# Patient Record
Sex: FEMALE | Race: BLACK OR AFRICAN AMERICAN | NOT HISPANIC OR LATINO | Employment: FULL TIME
[De-identification: names, ages, dates, MRNs, and addresses within clinical notes are randomized per-mention and may not be internally consistent; named-entity substitution may affect disease eponyms.]

---

## 2017-10-01 ENCOUNTER — HEALTH MAINTENANCE LETTER (OUTPATIENT)
Age: 32
End: 2017-10-01

## 2018-03-27 ENCOUNTER — OFFICE VISIT (OUTPATIENT)
Dept: OBGYN | Facility: CLINIC | Age: 33
End: 2018-03-27
Payer: COMMERCIAL

## 2018-03-27 VITALS
WEIGHT: 154.6 LBS | HEIGHT: 64 IN | DIASTOLIC BLOOD PRESSURE: 62 MMHG | BODY MASS INDEX: 26.4 KG/M2 | SYSTOLIC BLOOD PRESSURE: 100 MMHG

## 2018-03-27 DIAGNOSIS — D25.9 UTERINE LEIOMYOMA, UNSPECIFIED LOCATION: Primary | ICD-10-CM

## 2018-03-27 DIAGNOSIS — N97.9 INFERTILITY, FEMALE, PRIMARY: ICD-10-CM

## 2018-03-27 PROCEDURE — 99204 OFFICE O/P NEW MOD 45 MIN: CPT | Performed by: OBSTETRICS & GYNECOLOGY

## 2018-03-27 RX ORDER — NAPROXEN 500 MG/1
500 TABLET ORAL
COMMUNITY
Start: 2018-02-14 | End: 2018-10-16

## 2018-03-27 NOTE — PROGRESS NOTES
SUBJECTIVE:                                                   Natacha Espinoza is a 32 year old female who presents to clinic today for the following health issue(s):  Patient presents with:  Consult: Discuss fertility issues-       HPI:  The patient is seen today for fertility issues.  She has a long-standing history of infertility going back at least 10 years.  She has had a laparoscopic removal of her left tube because of hydrosalpinx.  Her right tube is patent.  She has been tried on cycles of Clomid ovulation induction.  She has had intrauterine inseminations.  She has a history of uterine fibroids but not any evaluation as to the size or change in the fibroids.  She would like to be pregnant.  She has a partner who has fathered children before.    Patient's last menstrual period was 2018 (exact date)..   Patient is sexually active, .  Using none for contraception.    reports that she has never smoked. She has never used smokeless tobacco.    STD testing offered?  Declined    Health maintenance updated:  yes    Today's PHQ-2 Score:   PHQ-2 (  [employer]) 2013   Q1: Little interest or pleasure in doing things 0   Q2: Feeling down, depressed or hopeless 0   PHQ-2 Score 0     Today's PHQ-9 Score: No flowsheet data found.  Today's CHRISTIN-7 Score: No flowsheet data found.    Problem list and histories reviewed & adjusted, as indicated.  Additional history: as documented.    Patient Active Problem List   Diagnosis     CARDIOVASCULAR SCREENING; LDL GOAL LESS THAN 160     Irregular bleeding     Tubal occlusion     Primary female infertility     Ovarian cyst     Pelvic pain     No past surgical history on file.   Social History   Substance Use Topics     Smoking status: Never Smoker     Smokeless tobacco: Never Used     Alcohol use No           Current Outpatient Prescriptions   Medication Sig     naproxen (NAPROSYN) 500 MG tablet Take 500 mg by mouth     No current facility-administered medications for  "this visit.      Allergies   Allergen Reactions     Chloroquine      Other reaction(s): Itching       ROS:  12 point review of systems negative other than symptoms noted below.    OBJECTIVE:     /62  Ht 5' 3.75\" (1.619 m)  Wt 154 lb 9.6 oz (70.1 kg)  LMP 03/21/2018 (Exact Date)  BMI 26.75 kg/m2  Body mass index is 26.75 kg/(m^2).    Exam:  Constitutional:  Appearance: Well nourished, well developed alert, in no acute distress  Chest:  Respiratory Effort:  Breathing unlabored  Cardiovascular: no raquel,a  Gastrointestinal:  Abdominal Examination:  Abdomen nontender to palpation,nt  Lymphatic: Lymph Nodes:  No other lymphadenopathy present  Skin:General Inspection:  No rashes present, no lesions present, no areas of discoloration; Genitalia and Groin:  No rashes present, no lesions present, no areas of discoloration, no masses present.  Neurologic/Psychiatric:  Mental Status:  Oriented X3   Pelvic Exam:  External Genitalia:     Normal appearance for age, no discharge present, no tenderness present, no inflammatory lesions present, color normal  Vagina:     Normal vaginal vault without central or paravaginal defects, no discharge present, no inflammatory lesions present, no masses present  Bladder:     Nontender to palpation  Urethra:   Urethral Body:  Urethra palpation normal, urethra structural support normal   Urethral Meatus:  No erythema or lesions present  Cervix:     Appearance healthy, no lesions present, nontender to palpation, no bleeding present  Uterus:     Uterus: firm, slightly enlarged sized and nontender, midplane in position.   Adnexa:     No adnexal tenderness present, no adnexal masses present  Perineum:     Perineum within normal limits, no evidence of trauma, no rashes or skin lesions present  Anus:     Anus within normal limits, no hemorrhoids present  Inguinal Lymph Nodes:     No lymphadenopathy present  Pubic Hair:     Normal pubic hair distribution for age  Genitalia and Groin:     No " rashes present, no lesions present, no areas of discoloration, no masses present       In-Clinic Test Results:  No results found for this or any previous visit (from the past 24 hour(s)).    ASSESSMENT/PLAN:                                                        ICD-10-CM    1. Uterine leiomyoma, unspecified location D25.9 MR Abdomen w Contrast   2. Infertility, female, primary N97.9 MR Abdomen w Contrast           Plan: Patient return to clinic following her MRI.  She will need to be evaluated as to whether or not a myomectomy may be indicated.  She will also be tried on letrozole protocol for ovulation induction.    Kole Jesus MD  [facility] [place]

## 2018-03-27 NOTE — MR AVS SNAPSHOT
"              After Visit Summary   3/27/2018    Natacha Espinoza    MRN: [id number]           Patient Information     Date Of Birth          1985        Visit Information        Provider Department      3/27/2018 11:30 AM Kole Jesus MD [facility]        Today's Diagnoses     Uterine leiomyoma, unspecified location    -  1    Infertility, female, primary           Follow-ups after your visit        Future tests that were ordered for you today     Open Future Orders        Priority Expected Expires Ordered    MR Abdomen w Contrast Routine  3/28/2019 3/27/2018            Who to contact     If you have questions or need follow up information about today's clinic visit or your schedule please contact [facility] directly at [phone].  Normal or non-critical lab and imaging results will be communicated to you by SocialShieldhart, letter or phone within 4 business days after the clinic has received the results. If you do not hear from us within 7 days, please contact the clinic through SocialShieldhart or phone. If you have a critical or abnormal lab result, we will notify you by phone as soon as possible.  Submit refill requests through [employer] or call your pharmacy and they will forward the refill request to us. Please allow 3 business days for your refill to be completed.          Additional Information About Your Visit        SocialShieldhart Information     [employer] lets you send messages to your doctor, view your test results, renew your prescriptions, schedule appointments and more. To sign up, go to [email or web address] . Click on \"Log in\" on the left side of the screen, which will take you to the Welcome page. Then click on \"Sign up Now\" on the right side of the page.     You will be asked to enter the access code listed below, as well as some personal information. Please follow the directions to create your username and password.     Your access code is: [email or web address]  Expires: " "2018  1:54 PM     Your access code will  in 90 days. If you need help or a new code, please call your [place] clinic or [phone].        Care EveryWhere ID     This is your Care EveryWhere ID. This could be used by other organizations to access your [place] medical records  [id number]        Your Vitals Were     Height Last Period BMI (Body Mass Index)             5' 3.75\" (1.619 m) 2018 (Exact Date) 26.75 kg/m2          Blood Pressure from Last 3 Encounters:   18 100/62   13 112/77   13 110/65    Weight from Last 3 Encounters:   18 154 lb 9.6 oz (70.1 kg)   13 144 lb (65.3 kg)   13 138 lb (62.6 kg)               Primary Care Provider Office Phone # Fax #    Jairon Mawuena Agbeh, [phone]       [address] [place] [address]        Equal Access to Services     YESSY HEREDIA : Hadii mehrdad ku hadbrie Sodarion, waaxda lukindra, qaybta kaaljeovanny kay, brian cox . So [facility] [phone].    ATENCIÓN: Si habla español, tiene a jimenez disposición servicios gratuitos de asistencia lingüística. [facility] [phone].    We comply with applicable federal civil rights laws and [place] laws. We do not discriminate on the basis of race, color, national origin, age, disability, sex, sexual orientation, or gender identity.            Thank you!     Thank you for choosing [facility]  for your care. Our goal is always to provide you with excellent care. Hearing back from our patients is one way we can continue to improve our services. Please take a few minutes to complete the written survey that you may receive in the mail after your visit with us. Thank you!             Your Updated Medication List - Protect others around you: Learn how to safely use, store and throw away your medicines at www.disposemymeds.org.          This list is accurate as of 3/27/18  1:54 PM.  Always use your most " recent med list.                   Brand Name Dispense Instructions for use Diagnosis    naproxen 500 MG tablet    NAPROSYN     Take 500 mg by mouth

## 2018-05-08 ENCOUNTER — RADIANT APPOINTMENT (OUTPATIENT)
Dept: MRI IMAGING | Facility: CLINIC | Age: 33
End: 2018-05-08
Attending: OBSTETRICS & GYNECOLOGY
Payer: COMMERCIAL

## 2018-05-08 PROCEDURE — A9585 GADOBUTROL INJECTION: HCPCS | Performed by: RADIOLOGY

## 2018-05-08 PROCEDURE — 72197 MRI PELVIS W/O & W/DYE: CPT | Performed by: RADIOLOGY

## 2018-05-08 RX ORDER — GADOBUTROL 604.72 MG/ML
7.5 INJECTION INTRAVENOUS ONCE
Status: COMPLETED | OUTPATIENT
Start: 2018-05-08 | End: 2018-05-08

## 2018-05-08 RX ADMIN — GADOBUTROL 7 ML: 604.72 INJECTION INTRAVENOUS at 12:14

## 2018-05-17 ENCOUNTER — TELEPHONE (OUTPATIENT)
Dept: OBGYN | Facility: CLINIC | Age: 33
End: 2018-05-17

## 2018-05-17 ENCOUNTER — OFFICE VISIT (OUTPATIENT)
Dept: OBGYN | Facility: CLINIC | Age: 33
End: 2018-05-17
Payer: COMMERCIAL

## 2018-05-17 VITALS
DIASTOLIC BLOOD PRESSURE: 62 MMHG | BODY MASS INDEX: 27.36 KG/M2 | WEIGHT: 154.4 LBS | SYSTOLIC BLOOD PRESSURE: 100 MMHG | HEIGHT: 63 IN

## 2018-05-17 DIAGNOSIS — D25.0 SUBMUCOUS LEIOMYOMA OF UTERUS: Primary | ICD-10-CM

## 2018-05-17 DIAGNOSIS — N88.2 CERVICAL STENOSIS (UTERINE CERVIX): ICD-10-CM

## 2018-05-17 PROCEDURE — 99214 OFFICE O/P EST MOD 30 MIN: CPT | Performed by: OBSTETRICS & GYNECOLOGY

## 2018-05-17 RX ORDER — MISOPROSTOL 200 UG/1
400 TABLET ORAL ONCE
Qty: 2 TABLET | Refills: 0 | Status: SHIPPED | OUTPATIENT
Start: 2018-05-17 | End: 2018-05-17

## 2018-05-17 NOTE — MR AVS SNAPSHOT
"              After Visit Summary   2018    Natacha Espinoza    MRN: [id number]           Patient Information     Date Of Birth          1985        Visit Information        Provider Department      2018 9:45 AM Kole Jesus MD [facility]        Today's Diagnoses     Submucous leiomyoma of uterus    -  1    Cervical stenosis (uterine cervix)           Follow-ups after your visit        Who to contact     If you have questions or need follow up information about today's clinic visit or your schedule please contact [facility] directly at [phone].  Normal or non-critical lab and imaging results will be communicated to you by Endocytehart, letter or phone within 4 business days after the clinic has received the results. If you do not hear from us within 7 days, please contact the clinic through [employer] or phone. If you have a critical or abnormal lab result, we will notify you by phone as soon as possible.  Submit refill requests through [employer] or call your pharmacy and they will forward the refill request to us. Please allow 3 business days for your refill to be completed.          Additional Information About Your Visit        MyChart Information     [employer] lets you send messages to your doctor, view your test results, renew your prescriptions, schedule appointments and more. To sign up, go to [email or web address] . Click on \"Log in\" on the left side of the screen, which will take you to the Welcome page. Then click on \"Sign up Now\" on the right side of the page.     You will be asked to enter the access code listed below, as well as some personal information. Please follow the directions to create your username and password.     Your access code is: [email or web address]  Expires: 2018  1:54 PM     Your access code will  in 90 days. If you need help or a new code, please call your [place] clinic or [phone].        Care EveryWhere ID     This is " "your Care EveryWhere ID. This could be used by other organizations to access your [place] medical records  [id number]        Your Vitals Were     Height Last Period BMI (Body Mass Index)             5' 3\" (1.6 m) 05/11/2018 (Exact Date) 27.35 kg/m2          Blood Pressure from Last 3 Encounters:   05/17/18 100/62   03/27/18 100/62   08/11/13 112/77    Weight from Last 3 Encounters:   05/17/18 154 lb 6.4 oz (70 kg)   03/27/18 154 lb 9.6 oz (70.1 kg)   04/18/13 144 lb (65.3 kg)              We Performed the Following     Stephanie-Operative Worksheet GYN          Today's Medication Changes          These changes are accurate as of 5/17/18 10:23 AM.  If you have any questions, ask your nurse or doctor.               Start taking these medicines.        Dose/Directions    misoprostol 200 MCG tablet   Commonly known as:  CYTOTEC   Used for:  Cervical stenosis (uterine cervix)   Started by:  Kole Jesus MD        Dose:  400 mcg   Take 2 tablets (400 mcg) by mouth once for 1 dose   Quantity:  2 tablet   Refills:  0            Where to get your medicines      These medications were sent to [facility] [id number] [place] [address] [address] [place] [address]  [address] [facility] [address]    Hours:  24-hours Phone:  [phone]     misoprostol 200 MCG tablet                Primary Care Provider Office Phone # Fax #    Jairon Mawuena Agbeh, [phone]       [address] [place] [address]        Equal Access to Services     YESSY HEREDIA AH: Hadii mehrdad Walsh, wanormada luqadaha, qaybta kaalmada karolynyamaritza, brian ng. So [facility] [phone].    ATENCIÓN: Si habla español, tiene a jimenez disposición servicios gratuitos de asistencia lingüística. Llame al [phone].    We comply with applicable federal civil rights laws and [place] laws. We do not discriminate on the basis of race, color, national origin, " age, disability, sex, sexual orientation, or gender identity.            Thank you!     Thank you for choosing [facility]  for your care. Our goal is always to provide you with excellent care. Hearing back from our patients is one way we can continue to improve our services. Please take a few minutes to complete the written survey that you may receive in the mail after your visit with us. Thank you!             Your Updated Medication List - Protect others around you: Learn how to safely use, store and throw away your medicines at www.disposemymeds.org.          This list is accurate as of 5/17/18 10:23 AM.  Always use your most recent med list.                   Brand Name Dispense Instructions for use Diagnosis    misoprostol 200 MCG tablet    CYTOTEC    2 tablet    Take 2 tablets (400 mcg) by mouth once for 1 dose    Cervical stenosis (uterine cervix)       naproxen 500 MG tablet    NAPROSYN     Take 500 mg by mouth

## 2018-05-17 NOTE — TELEPHONE ENCOUNTER
Pt will call to schedule    Order Questions      Question Answer Comment     Procedure name(s) - multi select hysteroscopy with MyoSure resection of intracavitary myoma      Reason for procedure submucous myoma      Is this a multi surgeon case? No      Laterality N/A      Request for additional equipment Other (see comments) None     Anesthesia General      Initiate Pre-op orders for above procedure: Yes, as ordered in Epic Additional orders noted there also     Location of Case: [facility] OR      Surgeon Procedure Time (incision to closure) in minutes (per procedure as applicable) 30      Note:  Surgical Case Time Needed (in minutes)     Patient Class (for admit prior to surgery, specify number of days in comments): Same day (hospital outpatient)      Why can t this outpatient surgery be done at the [facility] or  ASC? na      Vendor Needed? No

## 2018-05-17 NOTE — PROGRESS NOTES
SUBJECTIVE:                                                   Natacha Espinoza is a 32 year old female who presents to clinic today for the following health issue(s):  Patient presents with:  Follow Up For: patient had MRI done on 18. here for follow up results    HPI:  Patient is here for follow-up after her MRI.  Patient has been trying to get pregnant.  She has been unsuccessful.  Her partner has fathered children.  She has a history of uterine fibroid.  Recent MRI was performed which shows 2 myomas the larger myoma is in the posterior fundal portion of the uterus and then there is a smaller myoma which is noted to be submucosal.    Patient's last menstrual period was 2018 (exact date)..   Patient is sexually active, .  Using none for contraception.    reports that she has never smoked. She has never used smokeless tobacco.    STD testing offered?  Declined    Health maintenance updated:  yes    Today's PHQ-2 Score:   PHQ-2 (  [employer]) 2013   Q1: Little interest or pleasure in doing things 0   Q2: Feeling down, depressed or hopeless 0   PHQ-2 Score 0     Today's PHQ-9 Score: No flowsheet data found.  Today's CHRISTIN-7 Score: No flowsheet data found.    Problem list and histories reviewed & adjusted, as indicated.  Additional history: as documented.    Patient Active Problem List   Diagnosis     CARDIOVASCULAR SCREENING; LDL GOAL LESS THAN 160     Irregular bleeding     Tubal occlusion     Primary female infertility     Ovarian cyst     Pelvic pain     No past surgical history on file.   Social History   Substance Use Topics     Smoking status: Never Smoker     Smokeless tobacco: Never Used     Alcohol use No           Current Outpatient Prescriptions   Medication Sig     misoprostol (CYTOTEC) 200 MCG tablet Take 2 tablets (400 mcg) by mouth once for 1 dose     naproxen (NAPROSYN) 500 MG tablet Take 500 mg by mouth     No current facility-administered medications for this visit.      Allergies  "  Allergen Reactions     Chloroquine      Other reaction(s): Itching       ROS:  12 point review of systems negative other than symptoms noted below.    OBJECTIVE:     /62  Ht 5' 3\" (1.6 m)  Wt 154 lb 6.4 oz (70 kg)  LMP 05/11/2018 (Exact Date)  BMI 27.35 kg/m2  Body mass index is 27.35 kg/(m^2).    Exam:  Constitutional:  Appearance: Well nourished, well developed alert, in no acute distress  Neck:  Lymph Nodes:  No lymphadenopathy present; Thyroid:  Gland size normal, nontender, no nodules or masses present on palpation  Chest:  Respiratory Effort:  Breathing unlabored  Cardiovascular: Heart: Auscultation:  Regular rate, normal rhythm, no murmurs present  Gastrointestinal:  Abdominal Examination:  Abdomen nontender to palpation,  Lymphatic: Lymph Nodes:  No other lymphadenopathy present  Skin:General Inspection:  No rashes present, no lesions present, no areas of discoloration;   Neurologic/Psychiatric:  Mental Status:  Oriented X3      In-Clinic Test Results:    EXAMINATION: MR PELVIS W/O & W CONTRAST, 5/8/2018 12:28 PM     COMPARISON: Pelvic ultrasound on 9/11/2012     HISTORY: ; Uterine leiomyoma, unspecified location; Infertility,  female, primary     TECHNIQUE: Multiplanar, multisequence imaging was obtained of the  pelvis without and with intravenous contrast. Contrast dose: 7 ml  Gadavist     FINDINGS:   Vagina is distended with gel.     Uterus is retroverted and anteflexed, measuring approximately 7.4 x  6.2 x 9.0 cm. Junctional zone is not thickened. Normal hypointense  cervical stromal appearance is maintained. Nabothian cysts.  Endometrial stripe measures up to 4-5 mm on sagittal series 6     There are a few T2 hypointense hypoenhancing myometrial masses, the  largest of which is mural-based and measures 3.8 x 4.0 x 3.9 cm (axial  and CC dimensions, respectively) located at posterior uterine body  (series 5 image 18 and series 6 image 23). A second predominantly  mural, partially submucosal " mass measures 1.6 x 1.7 x 1.7 cm (axial  and CC dimensions, respectively) in the anterior uterine body, (series  5 image 13 and series 6 image 20). Associated mass effect results in  mild compression and distortion of the endometrial canal.     Degenerating right ovarian follicle or functional cyst. Area of  precontrast T1 hyperintensity in the left ovary, likely blood products  relating to a small hemorrhagic cyst. Otherwise normal appearance of  both ovaries. Left salpingectomy. Urinary bladder is decompressed.     Trace amount of free fluid in the pelvis, likely physiologic. No  significant lymphadenopathy in the pelvis. No worrisome osseous  lesion.         IMPRESSION:   1. Myomatous uterus including a dominant mural-based fibroid  posteriorly and a partially submucosal lesion anteriorly at the  body/fundus. Associated mass effect results in mild compression and  distortion of the endometrial canal.  2. Small probable left ovarian hemorrhagic cyst.     I have personally reviewed the examination and initial interpretation  and I agree with the findings.     JIM LIN MD    ASSESSMENT/PLAN:                                                        ICD-10-CM    1. Submucous leiomyoma of uterus D25.0 Stephanie-Operative Worksheet GYN   2. Cervical stenosis (uterine cervix) N88.2 misoprostol (CYTOTEC) 200 MCG tablet           Plan: Patient will be scheduled for a hysteroscopy with mild Essure resection of the submucous myoma.  She would like to schedule this and will be in the end of release from work form.  Her H&P was done today.  Procedure was explained to the patient along with potential risks and complications including risk of anesthesia, infection, bleeding and possible uterine perforation with injury to bowel.  Patient indicated that she understood and accepted these complications and potential risks.  She will call to schedule surgery.    Kole Jesus MD  [facility]

## 2018-05-18 NOTE — TELEPHONE ENCOUNTER
Spoke to pt and she stated she will call next week to schedule    Helga Kevin  Surgery Scheduler

## 2018-06-11 ENCOUNTER — HOSPITAL ENCOUNTER (OUTPATIENT)
Facility: CLINIC | Age: 33
End: 2018-06-11
Attending: OBSTETRICS & GYNECOLOGY | Admitting: OBSTETRICS & GYNECOLOGY
Payer: COMMERCIAL

## 2018-06-11 NOTE — TELEPHONE ENCOUNTER
Type of surgery: HSC w/MYOSURE RESECTION OF INTRACAVITARY MYOMA  Location of surgery: [facility] OR  Date and time of surgery: 7/13/2018 8:40AM ARRIVAL 6:40AM  Surgeon: SANJU Jesus  Pre-Op Appt Date: 6/19/2018  Post-Op Appt Date: TBD   Packet sent out: MAILED 6/11/2018  Pre-cert/Authorization completed:  TBD  Date: 6/11/2018 [place] w/Marlee Kevin  Surgery Scheduler

## 2018-07-09 RX ORDER — CEFAZOLIN SODIUM 1 G/3ML
1 INJECTION, POWDER, FOR SOLUTION INTRAMUSCULAR; INTRAVENOUS SEE ADMIN INSTRUCTIONS
Status: CANCELLED | OUTPATIENT
Start: 2018-07-09

## 2018-07-09 RX ORDER — CEFAZOLIN SODIUM 2 G/100ML
2 INJECTION, SOLUTION INTRAVENOUS
Status: CANCELLED | OUTPATIENT
Start: 2018-07-09

## 2018-07-11 NOTE — TELEPHONE ENCOUNTER
Called pt to confirm surgery due to repeated cancel of pre-op appointments.  Pt stated she called to cancel surgery and latest preop together and wants to do this in Sept  Will call me back with dates.    Spoke to Veronique at [facility] to cancel case.    Helga Kevin  Surgery Scheduler

## 2018-08-09 NOTE — TELEPHONE ENCOUNTER
Type of surgery: HSC w/MYOSURE RESECTION OF INTRACAVITARY MYOMA  Location of surgery: [facility] OR  Date and time of surgery: 9/21/2018 7:30am ARRIVAL 5:30am  Surgeon: Ann Marie  Pre-Op Appt Date: 9/17/2018   Post-Op Appt Date: TBD   Packet sent out: MAILED MAILED 8/9/2018  Pre-cert/Authorization completed:  TBD  Date: 8/9/2018 [place] w/Mariel Kevin  Surgery Scheduler

## 2018-08-28 ENCOUNTER — TELEPHONE (OUTPATIENT)
Dept: OBGYN | Facility: CLINIC | Age: 33
End: 2018-08-28

## 2018-08-28 NOTE — LETTER
[facility] [address]  [facility] [address]  [phone]        August 28, 2018    Natacha Espinoza  [address]   [place] [facility] [address]              Dear Natacha Espinoza    This is to remind you that your Hemoglobin is due.    You may call our office at [phone] to schedule an appointment.          Sincerely,        Kole Jesus MD

## 2018-09-17 ENCOUNTER — OFFICE VISIT (OUTPATIENT)
Dept: OBGYN | Facility: CLINIC | Age: 33
End: 2018-09-17
Payer: COMMERCIAL

## 2018-09-17 VITALS
HEIGHT: 63 IN | BODY MASS INDEX: 26.93 KG/M2 | DIASTOLIC BLOOD PRESSURE: 70 MMHG | WEIGHT: 152 LBS | SYSTOLIC BLOOD PRESSURE: 112 MMHG

## 2018-09-17 DIAGNOSIS — N92.0 MENORRHAGIA WITH REGULAR CYCLE: ICD-10-CM

## 2018-09-17 DIAGNOSIS — D25.0 SUBMUCOUS LEIOMYOMA OF UTERUS: ICD-10-CM

## 2018-09-17 DIAGNOSIS — Z01.812 PRE-OPERATIVE LABORATORY EXAMINATION: Primary | ICD-10-CM

## 2018-09-17 DIAGNOSIS — D25.0 SUBMUCOUS LEIOMYOMA OF UTERUS: Primary | ICD-10-CM

## 2018-09-17 LAB — HGB BLD-MCNC: 10 G/DL (ref 11.7–15.7)

## 2018-09-17 PROCEDURE — 85018 HEMOGLOBIN: CPT | Performed by: OBSTETRICS & GYNECOLOGY

## 2018-09-17 PROCEDURE — 36415 COLL VENOUS BLD VENIPUNCTURE: CPT | Performed by: OBSTETRICS & GYNECOLOGY

## 2018-09-17 PROCEDURE — 99213 OFFICE O/P EST LOW 20 MIN: CPT | Performed by: OBSTETRICS & GYNECOLOGY

## 2018-09-17 NOTE — LETTER
[facility] [place]  [address]  [facility] [address]  [phone]          September 17, 2018    RE:  Natacha Espinoza                                                                                                                                                       [address]   [facility] [address]            To whom it may concern:    Natacha Espinoza is under my professional care for Gynecology. She is having surgery on September 21, 2018  She may return to  Work September 27, 2018 with no restrictions.      Sincerely,        Kole Jesus MD

## 2018-09-17 NOTE — MR AVS SNAPSHOT
"              After Visit Summary   9/17/2018    Natacah Espinoza    MRN: [id number]           Patient Information     Date Of Birth          1985        Visit Information        Provider Department      9/17/2018 1:15 PM Kole Jesus MD [facility]        Today's Diagnoses     Submucous leiomyoma of uterus    -  1    Menorrhagia with regular cycle           Follow-ups after your visit        Your next 10 appointments already scheduled     Sep 21, 2018   Procedure with Kole Jesus MD   [facility] (--)    [address]  [facility] [id number] [phone]              Who to contact     If you have questions or need follow up information about today's clinic visit or your schedule please contact [facility] directly at [phone].  Normal or non-critical lab and imaging results will be communicated to you by MyChart, letter or phone within 4 business days after the clinic has received the results. If you do not hear from us within 7 days, please contact the clinic through MyChart or phone. If you have a critical or abnormal lab result, we will notify you by phone as soon as possible.  Submit refill requests through [employer] or call your pharmacy and they will forward the refill request to us. Please allow 3 business days for your refill to be completed.          Additional Information About Your Visit        MyChart Information     [employer] lets you send messages to your doctor, view your test results, renew your prescriptions, schedule appointments and more. To sign up, go to [email or web address] . Click on \"Log in\" on the left side of the screen, which will take you to the Welcome page. Then click on \"Sign up Now\" on the right side of the page.     You will be asked to enter the access code listed below, as well as some personal information. Please follow the directions to create your username and password.     Your " "access code is: [email or web address]  Expires: 2018  1:53 PM     Your access code will  in 90 days. If you need help or a new code, please call your [place] clinic or [phone].        Care EveryWhere ID     This is your Care EveryWhere ID. This could be used by other organizations to access your [place] medical records  [id number]        Your Vitals Were     Height Last Period BMI (Body Mass Index)             1.6 m (5' 3\") 2018 26.93 kg/m2          Blood Pressure from Last 3 Encounters:   18 112/70   18 100/62   18 100/62    Weight from Last 3 Encounters:   18 68.9 kg (152 lb)   18 70 kg (154 lb 6.4 oz)   18 70.1 kg (154 lb 9.6 oz)              Today, you had the following     No orders found for display       Primary Care Provider    None Specified       No primary provider on file.        Equal Access to Services     [facility]: Hadii aad ku hadasho Sodarion, waaxda luqadaha, qaybta kaalmada adeegyada, brian cox . So [facility] [phone].    ATENCIÓN: Si habla español, tiene a jimenez disposición servicios gratuitos de asistencia lingüística. Llame al [phone].    We comply with applicable federal civil rights laws and [place] laws. We do not discriminate on the basis of race, color, national origin, age, disability, sex, sexual orientation, or gender identity.            Thank you!     Thank you for choosing [facility] [place]  for your care. Our goal is always to provide you with excellent care. Hearing back from our patients is one way we can continue to improve our services. Please take a few minutes to complete the written survey that you may receive in the mail after your visit with us. Thank you!             Your Updated Medication List - Protect others around you: Learn how to safely use, store and throw away your medicines at www.disposemymeds.org.          This list is accurate as of 18  1:53 PM.  " Always use your most recent med list.                   Brand Name Dispense Instructions for use Diagnosis    naproxen 500 MG tablet    NAPROSYN     Take 500 mg by mouth

## 2018-09-17 NOTE — PROGRESS NOTES
"    SUBJECTIVE:                                                   Natacha Espinoza is a 33 year old female who presents to clinic today for the following health issue(s):  Patient presents with:  Pre-Op Exam: Myosure scheduled 18        HPI:  Patient has a known approximately 2 cm anterior submucous myoma.  She is scheduled for hysteroscopy with MyoSure resection on .  She seen today for preop.  She feels well.  She has had no flu, cold or symptoms of any medical condition.    Patient's last menstrual period was 2018..   Patient is sexually active, .  Using nothing for contraception.    reports that she has never smoked. She has never used smokeless tobacco.  Health maintenance updated:  yes      Problem list and histories reviewed & adjusted, as indicated.  Additional history: as documented.    Patient Active Problem List   Diagnosis     CARDIOVASCULAR SCREENING; LDL GOAL LESS THAN 160     Irregular bleeding     Tubal occlusion     Primary female infertility     Ovarian cyst     Pelvic pain     No past surgical history on file.   Social History   Substance Use Topics     Smoking status: Never Smoker     Smokeless tobacco: Never Used     Alcohol use No           Current Outpatient Prescriptions   Medication Sig     naproxen (NAPROSYN) 500 MG tablet Take 500 mg by mouth     No current facility-administered medications for this visit.      Allergies   Allergen Reactions     Chloroquine      Other reaction(s): Itching       ROS:  12 point review of systems negative other than symptoms noted below.    OBJECTIVE:     /70  Ht 1.6 m (5' 3\")  Wt 68.9 kg (152 lb)  LMP 2018  BMI 26.93 kg/m2  Body mass index is 26.93 kg/(m^2).    Exam:  Constitutional:  Appearance: Well nourished, well developed alert, in no acute distress  Neck:  Lymph Nodes:  No lymphadenopathy present; Thyroid:  Gland size normal, nontender, no nodules or masses present on palpation  Chest:  Respiratory Effort:  " Breathing unlabored  Cardiovascular: Heart: Auscultation:  Regular rate, normal rhythm, no murmurs present  Skin:General Inspection:  No rashes present, no lesions present, no areas of discoloration;   Neurologic/Psychiatric:  Mental Status:  Oriented X3      In-Clinic Test Results:  Results for orders placed or performed in visit on 09/17/18 (from the past 24 hour(s))   Hemoglobin   Result Value Ref Range    Hemoglobin 10.0 (L) 11.7 - 15.7 g/dL       ASSESSMENT/PLAN:                                                        ICD-10-CM    1. Submucous leiomyoma of uterus D25.0    2. Menorrhagia with regular cycle N92.0            Plan: Patient is scheduled for hysteroscopy with MyoSure resection of submucous myoma on September 21.  I reviewed the procedure with the patient.  This will be done under general anesthesia.  The patient is very aware of any potential risks such as uterine perforation, infection, bleeding and injury to other organs such as bowel or bladder.  She is planning on taking a few days off of work following the procedure.    Kole Jesus MD  [facility] [place]

## 2018-09-19 NOTE — H&P (VIEW-ONLY)
"    SUBJECTIVE:                                                   Natacha Espinoza is a 33 year old female who presents to clinic today for the following health issue(s):  Patient presents with:  Pre-Op Exam: Myosure scheduled 18        HPI:  Patient has a known approximately 2 cm anterior submucous myoma.  She is scheduled for hysteroscopy with mild Essure resection on .  She seen today for preop.  She feels well.  She has had no flu, cold or symptoms of any medical condition.    Patient's last menstrual period was 2018..   Patient is sexually active, .  Using nothing for contraception.    reports that she has never smoked. She has never used smokeless tobacco.  Health maintenance updated:  yes      Problem list and histories reviewed & adjusted, as indicated.  Additional history: as documented.    Patient Active Problem List   Diagnosis     CARDIOVASCULAR SCREENING; LDL GOAL LESS THAN 160     Irregular bleeding     Tubal occlusion     Primary female infertility     Ovarian cyst     Pelvic pain     No past surgical history on file.   Social History   Substance Use Topics     Smoking status: Never Smoker     Smokeless tobacco: Never Used     Alcohol use No           Current Outpatient Prescriptions   Medication Sig     naproxen (NAPROSYN) 500 MG tablet Take 500 mg by mouth     No current facility-administered medications for this visit.      Allergies   Allergen Reactions     Chloroquine      Other reaction(s): Itching       ROS:  12 point review of systems negative other than symptoms noted below.    OBJECTIVE:     /70  Ht 1.6 m (5' 3\")  Wt 68.9 kg (152 lb)  LMP 2018  BMI 26.93 kg/m2  Body mass index is 26.93 kg/(m^2).    Exam:  Constitutional:  Appearance: Well nourished, well developed alert, in no acute distress  Neck:  Lymph Nodes:  No lymphadenopathy present; Thyroid:  Gland size normal, nontender, no nodules or masses present on palpation  Chest:  Respiratory Effort:  " Breathing unlabored  Cardiovascular: Heart: Auscultation:  Regular rate, normal rhythm, no murmurs present  Skin:General Inspection:  No rashes present, no lesions present, no areas of discoloration;   Neurologic/Psychiatric:  Mental Status:  Oriented X3      In-Clinic Test Results:  Results for orders placed or performed in visit on 09/17/18 (from the past 24 hour(s))   Hemoglobin   Result Value Ref Range    Hemoglobin 10.0 (L) 11.7 - 15.7 g/dL       ASSESSMENT/PLAN:                                                        ICD-10-CM    1. Submucous leiomyoma of uterus D25.0    2. Menorrhagia with regular cycle N92.0            Plan: Patient is scheduled for hysteroscopy with mild Essure resection of submucous myoma on September 21.  I reviewed the procedure with the patient.  This will be done under general anesthesia.  The patient is very aware of any potential risks such as uterine perforation, infection, bleeding and injury to other organs such as bowel or bladder.  She is planning on taking a few days off of work following the procedure.    Kole Jesus MD  [facility] [place]

## 2018-09-20 DIAGNOSIS — N88.2 STENOSIS OF CERVIX: Primary | ICD-10-CM

## 2018-09-20 RX ORDER — MISOPROSTOL 200 UG/1
400 TABLET ORAL ONCE
Qty: 2 TABLET | Refills: 0 | Status: ON HOLD | OUTPATIENT
Start: 2018-09-20 | End: 2018-09-21

## 2018-09-21 ENCOUNTER — ANESTHESIA (OUTPATIENT)
Dept: SURGERY | Facility: CLINIC | Age: 33
End: 2018-09-21
Payer: COMMERCIAL

## 2018-09-21 ENCOUNTER — ANESTHESIA EVENT (OUTPATIENT)
Dept: SURGERY | Facility: CLINIC | Age: 33
End: 2018-09-21
Payer: COMMERCIAL

## 2018-09-21 ENCOUNTER — HOSPITAL ENCOUNTER (OUTPATIENT)
Facility: CLINIC | Age: 33
Discharge: HOME | End: 2018-09-21
Attending: OBSTETRICS & GYNECOLOGY | Admitting: OBSTETRICS & GYNECOLOGY
Payer: COMMERCIAL

## 2018-09-21 ENCOUNTER — SURGERY (OUTPATIENT)
Age: 33
End: 2018-09-21
Payer: COMMERCIAL

## 2018-09-21 VITALS
BODY MASS INDEX: 26.93 KG/M2 | HEIGHT: 63 IN | SYSTOLIC BLOOD PRESSURE: 113 MMHG | TEMPERATURE: 97.3 F | OXYGEN SATURATION: 100 % | HEART RATE: 74 BPM | DIASTOLIC BLOOD PRESSURE: 67 MMHG | RESPIRATION RATE: 16 BRPM | WEIGHT: 152 LBS

## 2018-09-21 DIAGNOSIS — G89.18 POST-OP PAIN: Primary | ICD-10-CM

## 2018-09-21 LAB
ABO + RH BLD: NORMAL
ABO + RH BLD: NORMAL
B-HCG SERPL-ACNC: <1 IU/L (ref 0–5)
BLD GP AB SCN SERPL QL: NORMAL
BLOOD BANK CMNT PATIENT-IMP: NORMAL
HGB BLD-MCNC: 9.9 G/DL (ref 11.7–15.7)
SPECIMEN EXP DATE BLD: NORMAL

## 2018-09-21 PROCEDURE — 71000012 ZZH RECOVERY PHASE 1 LEVEL 1 FIRST HR: Performed by: OBSTETRICS & GYNECOLOGY

## 2018-09-21 PROCEDURE — 25000128 H RX IP 250 OP 636: Performed by: ANESTHESIOLOGY

## 2018-09-21 PROCEDURE — 25000128 H RX IP 250 OP 636: Performed by: OBSTETRICS & GYNECOLOGY

## 2018-09-21 PROCEDURE — 85018 HEMOGLOBIN: CPT | Performed by: OBSTETRICS & GYNECOLOGY

## 2018-09-21 PROCEDURE — 71000013 ZZH RECOVERY PHASE 1 LEVEL 1 EA ADDTL HR: Performed by: OBSTETRICS & GYNECOLOGY

## 2018-09-21 PROCEDURE — 25000128 H RX IP 250 OP 636: Performed by: NURSE ANESTHETIST, CERTIFIED REGISTERED

## 2018-09-21 PROCEDURE — 37000008 ZZH ANESTHESIA TECHNICAL FEE, 1ST 30 MIN: Performed by: OBSTETRICS & GYNECOLOGY

## 2018-09-21 PROCEDURE — 36415 COLL VENOUS BLD VENIPUNCTURE: CPT | Performed by: OBSTETRICS & GYNECOLOGY

## 2018-09-21 PROCEDURE — 84702 CHORIONIC GONADOTROPIN TEST: CPT | Performed by: OBSTETRICS & GYNECOLOGY

## 2018-09-21 PROCEDURE — 58561 HYSTEROSCOPY REMOVE MYOMA: CPT | Performed by: OBSTETRICS & GYNECOLOGY

## 2018-09-21 PROCEDURE — 37000009 ZZH ANESTHESIA TECHNICAL FEE, EACH ADDTL 15 MIN: Performed by: OBSTETRICS & GYNECOLOGY

## 2018-09-21 PROCEDURE — 25000125 ZZHC RX 250: Performed by: NURSE ANESTHETIST, CERTIFIED REGISTERED

## 2018-09-21 PROCEDURE — 27210794 ZZH OR GENERAL SUPPLY STERILE: Performed by: OBSTETRICS & GYNECOLOGY

## 2018-09-21 PROCEDURE — 71000027 ZZH RECOVERY PHASE 2 EACH 15 MINS: Performed by: OBSTETRICS & GYNECOLOGY

## 2018-09-21 PROCEDURE — 88305 TISSUE EXAM BY PATHOLOGIST: CPT | Mod: 26 | Performed by: OBSTETRICS & GYNECOLOGY

## 2018-09-21 PROCEDURE — 40000170 ZZH STATISTIC PRE-PROCEDURE ASSESSMENT II: Performed by: OBSTETRICS & GYNECOLOGY

## 2018-09-21 PROCEDURE — 25000125 ZZHC RX 250: Performed by: OBSTETRICS & GYNECOLOGY

## 2018-09-21 PROCEDURE — 36000058 ZZH SURGERY LEVEL 3 EA 15 ADDTL MIN: Performed by: OBSTETRICS & GYNECOLOGY

## 2018-09-21 PROCEDURE — 86900 BLOOD TYPING SEROLOGIC ABO: CPT | Performed by: OBSTETRICS & GYNECOLOGY

## 2018-09-21 PROCEDURE — 36000056 ZZH SURGERY LEVEL 3 1ST 30 MIN: Performed by: OBSTETRICS & GYNECOLOGY

## 2018-09-21 PROCEDURE — 86901 BLOOD TYPING SEROLOGIC RH(D): CPT | Performed by: OBSTETRICS & GYNECOLOGY

## 2018-09-21 PROCEDURE — 25800025 ZZH RX 258: Performed by: OBSTETRICS & GYNECOLOGY

## 2018-09-21 PROCEDURE — 88305 TISSUE EXAM BY PATHOLOGIST: CPT | Performed by: OBSTETRICS & GYNECOLOGY

## 2018-09-21 PROCEDURE — 86850 RBC ANTIBODY SCREEN: CPT | Performed by: OBSTETRICS & GYNECOLOGY

## 2018-09-21 PROCEDURE — 25000132 ZZH RX MED GY IP 250 OP 250 PS 637: Performed by: OBSTETRICS & GYNECOLOGY

## 2018-09-21 RX ORDER — IBUPROFEN 600 MG/1
600 TABLET, FILM COATED ORAL
Status: DISCONTINUED | OUTPATIENT
Start: 2018-09-21 | End: 2018-09-21 | Stop reason: HOSPADM

## 2018-09-21 RX ORDER — PROPOFOL 10 MG/ML
INJECTION, EMULSION INTRAVENOUS CONTINUOUS PRN
Status: DISCONTINUED | OUTPATIENT
Start: 2018-09-21 | End: 2018-09-21

## 2018-09-21 RX ORDER — ONDANSETRON 2 MG/ML
4 INJECTION INTRAMUSCULAR; INTRAVENOUS EVERY 30 MIN PRN
Status: DISCONTINUED | OUTPATIENT
Start: 2018-09-21 | End: 2018-09-21 | Stop reason: HOSPADM

## 2018-09-21 RX ORDER — DEXAMETHASONE SODIUM PHOSPHATE 4 MG/ML
INJECTION, SOLUTION INTRA-ARTICULAR; INTRALESIONAL; INTRAMUSCULAR; INTRAVENOUS; SOFT TISSUE PRN
Status: DISCONTINUED | OUTPATIENT
Start: 2018-09-21 | End: 2018-09-21

## 2018-09-21 RX ORDER — HYDROCODONE BITARTRATE AND ACETAMINOPHEN 5; 325 MG/1; MG/1
2 TABLET ORAL
Status: COMPLETED | OUTPATIENT
Start: 2018-09-21 | End: 2018-09-21

## 2018-09-21 RX ORDER — ONDANSETRON 4 MG/1
4 TABLET, ORALLY DISINTEGRATING ORAL EVERY 30 MIN PRN
Status: DISCONTINUED | OUTPATIENT
Start: 2018-09-21 | End: 2018-09-21 | Stop reason: HOSPADM

## 2018-09-21 RX ORDER — PROPOFOL 10 MG/ML
INJECTION, EMULSION INTRAVENOUS PRN
Status: DISCONTINUED | OUTPATIENT
Start: 2018-09-21 | End: 2018-09-21

## 2018-09-21 RX ORDER — CEFAZOLIN SODIUM 1 G/3ML
1 INJECTION, POWDER, FOR SOLUTION INTRAMUSCULAR; INTRAVENOUS SEE ADMIN INSTRUCTIONS
Status: DISCONTINUED | OUTPATIENT
Start: 2018-09-21 | End: 2018-09-21 | Stop reason: HOSPADM

## 2018-09-21 RX ORDER — NALOXONE HYDROCHLORIDE 0.4 MG/ML
.1-.4 INJECTION, SOLUTION INTRAMUSCULAR; INTRAVENOUS; SUBCUTANEOUS
Status: DISCONTINUED | OUTPATIENT
Start: 2018-09-21 | End: 2018-09-21 | Stop reason: HOSPADM

## 2018-09-21 RX ORDER — LIDOCAINE HYDROCHLORIDE 20 MG/ML
INJECTION, SOLUTION INFILTRATION; PERINEURAL PRN
Status: DISCONTINUED | OUTPATIENT
Start: 2018-09-21 | End: 2018-09-21

## 2018-09-21 RX ORDER — SODIUM CHLORIDE, SODIUM LACTATE, POTASSIUM CHLORIDE, CALCIUM CHLORIDE 600; 310; 30; 20 MG/100ML; MG/100ML; MG/100ML; MG/100ML
INJECTION, SOLUTION INTRAVENOUS CONTINUOUS
Status: DISCONTINUED | OUTPATIENT
Start: 2018-09-21 | End: 2018-09-21 | Stop reason: HOSPADM

## 2018-09-21 RX ORDER — HYDROCODONE BITARTRATE AND ACETAMINOPHEN 5; 325 MG/1; MG/1
1-2 TABLET ORAL EVERY 4 HOURS PRN
Qty: 20 TABLET | Refills: 0 | Status: SHIPPED | OUTPATIENT
Start: 2018-09-21 | End: 2018-10-16

## 2018-09-21 RX ORDER — CEFAZOLIN SODIUM 2 G/100ML
2 INJECTION, SOLUTION INTRAVENOUS
Status: COMPLETED | OUTPATIENT
Start: 2018-09-21 | End: 2018-09-21

## 2018-09-21 RX ORDER — FENTANYL CITRATE 50 UG/ML
INJECTION, SOLUTION INTRAMUSCULAR; INTRAVENOUS PRN
Status: DISCONTINUED | OUTPATIENT
Start: 2018-09-21 | End: 2018-09-21

## 2018-09-21 RX ORDER — KETOROLAC TROMETHAMINE 30 MG/ML
30 INJECTION, SOLUTION INTRAMUSCULAR; INTRAVENOUS ONCE
Status: COMPLETED | OUTPATIENT
Start: 2018-09-21 | End: 2018-09-21

## 2018-09-21 RX ORDER — FENTANYL CITRATE 50 UG/ML
25-50 INJECTION, SOLUTION INTRAMUSCULAR; INTRAVENOUS
Status: DISCONTINUED | OUTPATIENT
Start: 2018-09-21 | End: 2018-09-21 | Stop reason: HOSPADM

## 2018-09-21 RX ORDER — MAGNESIUM HYDROXIDE 1200 MG/15ML
LIQUID ORAL PRN
Status: DISCONTINUED | OUTPATIENT
Start: 2018-09-21 | End: 2018-09-21 | Stop reason: HOSPADM

## 2018-09-21 RX ORDER — ONDANSETRON 2 MG/ML
INJECTION INTRAMUSCULAR; INTRAVENOUS PRN
Status: DISCONTINUED | OUTPATIENT
Start: 2018-09-21 | End: 2018-09-21

## 2018-09-21 RX ORDER — MEPERIDINE HYDROCHLORIDE 25 MG/ML
12.5 INJECTION INTRAMUSCULAR; INTRAVENOUS; SUBCUTANEOUS
Status: DISCONTINUED | OUTPATIENT
Start: 2018-09-21 | End: 2018-09-21 | Stop reason: HOSPADM

## 2018-09-21 RX ORDER — SODIUM CHLORIDE, SODIUM LACTATE, POTASSIUM CHLORIDE, CALCIUM CHLORIDE 600; 310; 30; 20 MG/100ML; MG/100ML; MG/100ML; MG/100ML
INJECTION, SOLUTION INTRAVENOUS CONTINUOUS PRN
Status: DISCONTINUED | OUTPATIENT
Start: 2018-09-21 | End: 2018-09-21

## 2018-09-21 RX ORDER — IBUPROFEN 600 MG/1
600 TABLET, FILM COATED ORAL EVERY 6 HOURS PRN
Qty: 30 TABLET | Refills: 0 | Status: SHIPPED | OUTPATIENT
Start: 2018-09-21 | End: 2018-10-31

## 2018-09-21 RX ORDER — HYDROMORPHONE HYDROCHLORIDE 1 MG/ML
.3-.5 INJECTION, SOLUTION INTRAMUSCULAR; INTRAVENOUS; SUBCUTANEOUS EVERY 10 MIN PRN
Status: DISCONTINUED | OUTPATIENT
Start: 2018-09-21 | End: 2018-09-21 | Stop reason: HOSPADM

## 2018-09-21 RX ORDER — GLYCOPYRROLATE 0.2 MG/ML
INJECTION, SOLUTION INTRAMUSCULAR; INTRAVENOUS PRN
Status: DISCONTINUED | OUTPATIENT
Start: 2018-09-21 | End: 2018-09-21

## 2018-09-21 RX ADMIN — SODIUM CHLORIDE, POTASSIUM CHLORIDE, SODIUM LACTATE AND CALCIUM CHLORIDE: 600; 310; 30; 20 INJECTION, SOLUTION INTRAVENOUS at 10:23

## 2018-09-21 RX ADMIN — CEFAZOLIN SODIUM 2 G: 2 INJECTION, SOLUTION INTRAVENOUS at 07:50

## 2018-09-21 RX ADMIN — SODIUM CHLORIDE 3000 ML: 900 IRRIGANT IRRIGATION at 08:04

## 2018-09-21 RX ADMIN — LIDOCAINE HYDROCHLORIDE 80 MG: 20 INJECTION, SOLUTION INFILTRATION; PERINEURAL at 07:45

## 2018-09-21 RX ADMIN — VASOPRESSIN 10 ML: 20 INJECTION INTRAVENOUS at 08:04

## 2018-09-21 RX ADMIN — ONDANSETRON 4 MG: 2 INJECTION INTRAMUSCULAR; INTRAVENOUS at 08:19

## 2018-09-21 RX ADMIN — HYDROCODONE BITARTRATE AND ACETAMINOPHEN 2 TABLET: 5; 325 TABLET ORAL at 11:10

## 2018-09-21 RX ADMIN — DEXAMETHASONE SODIUM PHOSPHATE 4 MG: 4 INJECTION, SOLUTION INTRA-ARTICULAR; INTRALESIONAL; INTRAMUSCULAR; INTRAVENOUS; SOFT TISSUE at 07:55

## 2018-09-21 RX ADMIN — SODIUM CHLORIDE 3000 ML: 900 IRRIGANT IRRIGATION at 08:06

## 2018-09-21 RX ADMIN — PROPOFOL 200 MCG/KG/MIN: 10 INJECTION, EMULSION INTRAVENOUS at 07:48

## 2018-09-21 RX ADMIN — SODIUM CHLORIDE, POTASSIUM CHLORIDE, SODIUM LACTATE AND CALCIUM CHLORIDE: 600; 310; 30; 20 INJECTION, SOLUTION INTRAVENOUS at 07:39

## 2018-09-21 RX ADMIN — MIDAZOLAM 2 MG: 1 INJECTION INTRAMUSCULAR; INTRAVENOUS at 07:40

## 2018-09-21 RX ADMIN — FENTANYL CITRATE 50 MCG: 50 INJECTION, SOLUTION INTRAMUSCULAR; INTRAVENOUS at 07:40

## 2018-09-21 RX ADMIN — GLYCOPYRROLATE 0.2 MG: 0.2 INJECTION, SOLUTION INTRAMUSCULAR; INTRAVENOUS at 08:08

## 2018-09-21 RX ADMIN — FENTANYL CITRATE 25 MCG: 50 INJECTION, SOLUTION INTRAMUSCULAR; INTRAVENOUS at 09:41

## 2018-09-21 RX ADMIN — PROPOFOL 160 MG: 10 INJECTION, EMULSION INTRAVENOUS at 07:45

## 2018-09-21 RX ADMIN — SODIUM CHLORIDE 1000 ML: 900 IRRIGANT IRRIGATION at 08:06

## 2018-09-21 RX ADMIN — KETOROLAC TROMETHAMINE 30 MG: 30 INJECTION, SOLUTION INTRAMUSCULAR at 09:32

## 2018-09-21 ASSESSMENT — LIFESTYLE VARIABLES: TOBACCO_USE: 0

## 2018-09-21 NOTE — DISCHARGE INSTRUCTIONS
Same Day Surgery Discharge Instructions for  Sedation and General Anesthesia       It's not unusual to feel dizzy, light-headed or faint for up to 24 hours after surgery or while taking pain medication.  If you have these symptoms: sit for a few minutes before standing and have someone assist you when you get up to walk or use the bathroom.      You should rest and relax for the next 24 hours. We recommend you make arrangements to have an adult stay with you for at least 24 hours after your discharge.  Avoid hazardous and strenuous activity.      DO NOT DRIVE any vehicle or operate mechanical equipment for 24 hours following the end of your surgery.  Even though you may feel normal, your reactions may be affected by the medication you have received.      Do not drink alcoholic beverages for 24 hours following surgery.       Slowly progress to your regular diet as you feel able. It's not unusual to feel nauseated and/or vomit after receiving anesthesia.  If you develop these symptoms, drink clear liquids (apple juice, ginger ale, broth, 7-up, etc. ) until you feel better.  If your nausea and vomiting persists for 24 hours, please notify your surgeon.        All narcotic pain medications, along with inactivity and anesthesia, can cause constipation. Drinking plenty of liquids and increasing fiber intake will help.      For any questions of a medical nature, call your surgeon.      Do not make important decisions for 24 hours.      If you had general anesthesia, you may have a sore throat for a couple of days related to the breathing tube used during surgery.  You may use Cepacol lozenges to help with this discomfort.  If it worsens or if you develop a fever, contact your surgeon.       If you feel your pain is not well managed with the pain medications prescribed by your surgeon, please contact your surgeon's office to let them know so they can address your concerns.       [facility]  Discharge  Instructions  Following D & C / Hysteroscopy    Activity  You may resume normal activities including lifting as needed.  It is permissible to climb stairs. You may drive a car after 24 hours as long as you are not taking narcotic pain pills.   Baths or showers are perfectly acceptable.     Vaginal Discharge  You may have some vaginal bleeding or discharge for about a week after procedure.  You may use tampons or pads.    Temperature  If you develop temperature elevations to over 101  Fahrenheit, your physician should be called immediately.    Diet  [place] or light diet is advisable the day of surgery.  If nausea persists, continue this diet.  If severe, call.    Follow-up  Make an appointment in 1-2 weeks if instructed to at: [phone]         [facility]   [phone]                                                            Today you received Toradol, an antiinflammatory medication similar to Ibuprofen.  You should not take other antiinflammatory medication, such as Ibuprofen, Motrin, Advil, Aleve, Naprosyn, etc until 3:30 pm.

## 2018-09-21 NOTE — SUMMARY OF CARE
"Pt discharge to home via boyfriend. discharge instructions reviewed, boyfriend verbalized understanding. Rx medication was reveiwed. Pt did take PO medication prior to discharge (see MAR) stated her pain was \"better\". discharge instructions/medications updated and sent with pt/boyfriend. discharge to home via volunteer services.  "

## 2018-09-21 NOTE — OP NOTE
Procedure Date: 2018      PREOPERATIVE STATUS:  The patient is a 33-year-old female,  0, who has known anterior uterine myomas.  She had her most recent ultrasound in May which showed an approximately 2 cm anterior submucous myoma and an approximately 4 cm posterior fundal myoma.  It was not appreciated on MRI that it was submucosal.  She is now admitted for resection of the submucosal myomas.      PREOPERATIVE DIAGNOSIS: Submucosal myoma.      POSTOPERATIVE DIAGNOSIS: Two submucosal myomas anteriorly and posteriorly, possible residual posterior submucous myoma.      OPERATION:  Hysteroscopy with MyoSure resection of submucous myomas.      SURGEON:  Kole Jesus MD      ANESTHESIA:  General.      OPERATIVE PROCEDURE:  Under general anesthesia, the patient was placed in lithotomy position, prepped and draped in the usual fashion.  Speculum was placed in the vagina, anterior lip of cervix grasped with a tenaculum and an os finder was inserted.  The cervical os was dilated from having taken Cytotec the night before.  The cervix was dilated through a 21 Syriac.  A spinal needle was then placed into the cavity where 10 mL of a dilute vasopressin solution was injected into the myometrium.  The MyoSure hysteroscope was then inserted and the findings of the anterior 2 cm type 1 submucous myoma was noted and there was much larger posterior submucous myoma which measured approximately 5 cm and was type 2.  Using the resection tool, the posterior myoma was resected first. In total approximately 75% of the myoma was removed.  Anteriorly the entire 2 cm myoma was resected.  Fluid deficit was approaching 1500 mL, although there was a lot of fluid on the floor.  It was elected to stop at this time as the posterior myoma was no longer delivering into the cavity.  It was decided that if necessary will come back and do a second procedure.  At the end of the case, another 10 mL of vasopressin was injected into the  uterus.  The instruments were removed.  The patient was taken to recovery in good condition.  Estimated blood loss was 5 mL.          RILEY LUCAS MD             D: 2018   T: 2018   MT: HAILY      Name:     RUDY CRUZ   MRN:      [id number]        Account:        [id number]   :      1985           Procedure Date: 2018      Document: [id number]       cc: Emiliano Martinez MD

## 2018-09-21 NOTE — IP AVS SNAPSHOT
[facility] PreOP/Phase II    [address]    [place] [address]    Phone:  [phone]                                       After Visit Summary   9/21/2018    Natacha Espinoza    MRN: [id number]           After Visit Summary Signature Page     I have received my discharge instructions, and my questions have been answered. I have discussed any challenges I see with this plan with the nurse or doctor.    ..........................................................................................................................................  Patient/Patient Representative Signature      ..........................................................................................................................................  Patient Representative Print Name and Relationship to Patient    ..................................................               ................................................  Date                                   Time    ..........................................................................................................................................  Reviewed by Signature/Title    ...................................................              ..............................................  Date                                               Time          22EPIC Rev 08/18

## 2018-09-21 NOTE — ANESTHESIA POSTPROCEDURE EVALUATION
Patient: Natacha Espinoza    Procedure(s):  HYSTEROSCOPY WITH MYOSURE ; RESECTION OF INTRACAVITARY MYOMA .  - Wound Class: II-Clean Contaminated    Diagnosis:SUBMUCOUS MYOMA   Diagnosis Additional Information: No value filed.    Anesthesia Type:  General, LMA    Note:  Anesthesia Post Evaluation    Patient location during evaluation: PACU  Patient participation: Able to fully participate in evaluation  Level of consciousness: awake and alert  Pain management: adequate  Airway patency: patent  Cardiovascular status: acceptable and hemodynamically stable  Respiratory status: acceptable and unassisted  Hydration status: acceptable  PONV: none       Comments: Prolonged emergence (LMA to PACU), 17 min to pull LMA.  Waking up appropriately        Last vitals:  Vitals:    09/21/18 1000 09/21/18 1015 09/21/18 1030   BP: 119/71 119/74 118/72   Resp: 18 17 19   Temp: 36.1  C (97  F) 36.2  C (97.2  F) 36.3  C (97.3  F)   SpO2: 100% 100% 100%         Electronically Signed By: Marcelino Owusu DO  September 21, 2018  11:24 AM

## 2018-09-21 NOTE — IP AVS SNAPSHOT
MRN:[id number]                      After Visit Summary   9/21/2018    Natacha Espinoza    MRN: [id number]           Thank you!     Thank you for choosing [facility] for your care. Our goal is always to provide you with excellent care. Hearing back from our patients is one way we can continue to improve our services. Please take a few minutes to complete the written survey that you may receive in the mail after you visit with us. Thank you!        Patient Information     Date Of Birth          1985        About your hospital stay     You were admitted on:  September 21, 2018 You last received care in the:  [facility] PreOP/Phase II    You were discharged on:  September 21, 2018       Who to Call     For medical emergencies, please call 911.  For non-urgent questions about your medical care, please call your primary care provider or clinic, [phone]  For questions related to your surgery, please call your surgery clinic        Attending Provider     Provider Specialty    Kole Jesus MD OB/Gyn       Primary Care Provider Office Phone # Fax #    [facility] [place] [facility] [phone]      After Care Instructions     Discharge Instructions       Patient to arrange follow up appointment in 2-3  weeks                  Further instructions from your care team        Same Day Surgery Discharge Instructions for  Sedation and General Anesthesia       It's not unusual to feel dizzy, light-headed or faint for up to 24 hours after surgery or while taking pain medication.  If you have these symptoms: sit for a few minutes before standing and have someone assist you when you get up to walk or use the bathroom.      You should rest and relax for the next 24 hours. We recommend you make arrangements to have an adult stay with you for at least 24 hours after your discharge.  Avoid hazardous and strenuous activity.      DO NOT DRIVE any vehicle or operate mechanical  equipment for 24 hours following the end of your surgery.  Even though you may feel normal, your reactions may be affected by the medication you have received.      Do not drink alcoholic beverages for 24 hours following surgery.       Slowly progress to your regular diet as you feel able. It's not unusual to feel nauseated and/or vomit after receiving anesthesia.  If you develop these symptoms, drink clear liquids (apple juice, ginger ale, broth, 7-up, etc. ) until you feel better.  If your nausea and vomiting persists for 24 hours, please notify your surgeon.        All narcotic pain medications, along with inactivity and anesthesia, can cause constipation. Drinking plenty of liquids and increasing fiber intake will help.      For any questions of a medical nature, call your surgeon.      Do not make important decisions for 24 hours.      If you had general anesthesia, you may have a sore throat for a couple of days related to the breathing tube used during surgery.  You may use Cepacol lozenges to help with this discomfort.  If it worsens or if you develop a fever, contact your surgeon.       If you feel your pain is not well managed with the pain medications prescribed by your surgeon, please contact your surgeon's office to let them know so they can address your concerns.       [facility]  Discharge Instructions  Following D & C / Hysteroscopy    Activity  You may resume normal activities including lifting as needed.  It is permissible to climb stairs. You may drive a car after 24 hours as long as you are not taking narcotic pain pills.   Baths or showers are perfectly acceptable.     Vaginal Discharge  You may have some vaginal bleeding or discharge for about a week after procedure.  You may use tampons or pads.    Temperature  If you develop temperature elevations to over 101  Fahrenheit, your physician should be called immediately.    Diet  [place] or light diet is advisable the day of  "surgery.  If nausea persists, continue this diet.  If severe, call.    Follow-up  Make an appointment in 1-2 weeks if instructed to at: [phone]         [facility]   [phone]                                                            Today you received Toradol, an antiinflammatory medication similar to Ibuprofen.  You should not take other antiinflammatory medication, such as Ibuprofen, Motrin, Advil, Aleve, Naprosyn, etc until 3:30 pm.     Pending Results     Date and Time Order Name Status Description    2018 0809 Surgical pathology exam In process             Admission Information     Date & Time Provider Department Dept. Phone    2018 Kole Jesus MD [facility] PreOP/Phase [phone]      Your Vitals Were     Blood Pressure Temperature Respirations Height Weight Last Period    118/72 97.3  F (36.3  C) 19 1.6 m (5' 3\") 68.9 kg (152 lb) 2018    Pulse Oximetry BMI (Body Mass Index)                100% 26.93 kg/m2          [email or web address] Information     [employer] lets you send messages to your doctor, view your test results, renew your prescriptions, schedule appointments and more. To sign up, go to [email or web address] . Click on \"Log in\" on the left side of the screen, which will take you to the Welcome page. Then click on \"Sign up Now\" on the right side of the page.     You will be asked to enter the access code listed below, as well as some personal information. Please follow the directions to create your username and password.     Your access code is: [email or web address]  Expires: 2018  1:53 PM     Your access code will  in 90 days. If you need help or a new code, please call your [place] clinic or [phone].        Care EveryWhere ID     This is your Care EveryWhere ID. This could be used by other organizations to access your [place] [place] medical records  [id number]        Equal Access to Services     CON HEREDIA AH: Macie Walsh, " waaxda luqadaha, qaybta kaalmada rahul, brian blackaan ah. So [facility] [phone].    ATENCIÓN: Si lenin camilo, tiene a jimenez disposición servicios gratuitos de asistencia lingüística. [place] al [phone].    We comply with applicable federal civil rights laws and [place] laws. We do not discriminate on the basis of race, color, national origin, age, disability, sex, sexual orientation, or gender identity.               Review of your medicines      START taking        Dose / Directions    HYDROcodone-acetaminophen 5-325 MG per tablet   Commonly known as:  NORCO   Used for:  Post-op pain        Dose:  1-2 tablet   Take 1-2 tablets by mouth every 4 hours as needed for pain (Moderate to Severe Pain)   Quantity:  20 tablet   Refills:  0       ibuprofen 600 MG tablet   Commonly known as:  ADVIL/MOTRIN   Used for:  Post-op pain        Dose:  600 mg   Take 1 tablet (600 mg) by mouth every 6 hours as needed for pain (mild)   Quantity:  30 tablet   Refills:  0         CONTINUE these medicines which have NOT CHANGED        Dose / Directions    naproxen 500 MG tablet   Commonly known as:  NAPROSYN        Dose:  500 mg   Take 500 mg by mouth   Refills:  0         STOP taking     misoprostol 200 MCG tablet   Commonly known as:  CYTOTEC                Where to get your medicines      These medications were sent to [facility] - [address]  [address] [facility] [address], [place] [address]     Phone:  [phone]     ibuprofen 600 MG tablet         Some of these will need a paper prescription and others can be bought over the counter. Ask your nurse if you have questions.     Bring a paper prescription for each of these medications     HYDROcodone-acetaminophen 5-325 MG per tablet                Protect others around you: Learn how to safely use, store and throw away your medicines at www.disposemymeds.org.        Information about OPIOIDS     PRESCRIPTION OPIOIDS: WHAT  YOU NEED TO KNOW   We gave you an opioid (narcotic) pain medicine. It is important to manage your pain, but opioids are not always the best choice. You should first try all the other options your care team gave you. Take this medicine for as short a time (and as few doses) as possible.    Some activities can increase your pain, such as bandage changes or therapy sessions. It may help to take your pain medicine 30 to 60 minutes before these activities. Reduce your stress by getting enough sleep, working on hobbies you enjoy and practicing relaxation or meditation. Talk to your care team about ways to manage your pain beyond prescription opioids.    These medicines have risks:    DO NOT drive when on new or higher doses of pain medicine. These medicines can affect your alertness and reaction times, and you could be arrested for driving under the influence (DUI). If you need to use opioids long-term, talk to your care team about driving.    DO NOT operate heavy machinery    DO NOT do any other dangerous activities while taking these medicines.    DO NOT drink any alcohol while taking these medicines.     If the opioid prescribed includes acetaminophen, DO NOT take with any other medicines that contain acetaminophen. Read all labels carefully. Look for the word  acetaminophen  or  Tylenol.  Ask your pharmacist if you have questions or are unsure.    You can get addicted to pain medicines, especially if you have a history of addiction (chemical, alcohol or substance dependence). Talk to your care team about ways to reduce this risk.    All opioids tend to cause constipation. Drink plenty of water and eat foods that have a lot of fiber, such as fruits, vegetables, prune juice, apple juice and high-fiber cereal. Take a laxative (Miralax, milk of magnesia, Colace, Senna) if you don t move your bowels at least every other day. Other side effects include upset stomach, sleepiness, dizziness, throwing up, tolerance (needing  more of the medicine to have the same effect), physical dependence and slowed breathing.    Store your pills in a secure place, locked if possible. We will not replace any lost or stolen medicine. If you don t finish your medicine, please throw away (dispose) as directed by your pharmacist. The [place] Pollution Control Agency has more information about safe disposal: https://www.pca.[facility].[place].us/living-green/managing-unwanted-medications             Medication List: This is a list of all your medications and when to take them. Check marks below indicate your daily home schedule. Keep this list as a reference.      Medications           Morning Afternoon Evening Bedtime As Needed    HYDROcodone-acetaminophen 5-325 MG per tablet   Commonly known as:  NORCO   Take 1-2 tablets by mouth every 4 hours as needed for pain (Moderate to Severe Pain)   Last time this was given:  2 tablets on 9/21/2018 11:10 AM                                ibuprofen 600 MG tablet   Commonly known as:  ADVIL/MOTRIN   Take 1 tablet (600 mg) by mouth every 6 hours as needed for pain (mild)                                naproxen 500 MG tablet   Commonly known as:  NAPROSYN   Take 500 mg by mouth

## 2018-09-21 NOTE — BRIEF OP NOTE
[facility] Brief Operative Note    Pre-operative diagnosis: SUBMUCOUS MYOMA    Post-operative diagnosis same   Procedure: Procedure(s):  HYSTEROSCOPY WITH MYOSURE ; RESECTION OF INTRACAVITARY MYOMA .  - Wound Class: II-Clean Contaminated   Surgeon(s): Surgeon(s) and Role:     * Kole Jesus MD - Primary   Estimated blood loss: * No values recorded between 9/21/2018  8:00 AM and 9/21/2018  8:22 AM *    Specimens:   ID Type Source Tests Collected by Time Destination   A : uterine myoma Tissue Uterus SURGICAL PATHOLOGY EXAM Kole Jesus MD 9/21/2018  8:08 AM       Findings: Uterus sounded to 8 cm.   2 submucous myomas anterior 2 cm type 1 myoma and 5 cm posterior type 2 myoma.  Anterior resected completely. 75 % of posterior myoma resected.  930 ml fluid deficit.

## 2018-09-21 NOTE — ANESTHESIA PREPROCEDURE EVALUATION
Anesthesia Evaluation     .             ROS/MED HX    ENT/Pulmonary:      (-) tobacco use and asthma   Neurologic:  - neg neurologic ROS     Cardiovascular:  - neg cardiovascular ROS       METS/Exercise Tolerance:     Hematologic:         Musculoskeletal:         GI/Hepatic:        (-) GERD and liver disease   Renal/Genitourinary: Comment: Uterine myoma     (-) renal disease   Endo:      (-) Type II DM and thyroid disease   Psychiatric:         Infectious Disease:         Malignancy:         Other:                     Physical Exam  Normal systems: pulmonary and dental    Airway   Mallampati: II  TM distance: >3 FB  Neck ROM: full    Dental     Cardiovascular   Rhythm and rate: regular  (-) no murmur    Pulmonary                     Anesthesia Plan      History & Physical Review  History and physical reviewed and following examination; no interval change.    ASA Status:  2 .    NPO Status:  > 8 hours    Plan for General and LMA with Intravenous and Propofol induction.   PONV prophylaxis:  Ondansetron (or other 5HT-3)       Postoperative Care      Consents  Anesthetic plan, risks, benefits and alternatives discussed with:  Patient..                          .

## 2018-09-24 LAB — COPATH REPORT: NORMAL

## 2018-09-28 ENCOUNTER — TELEPHONE (OUTPATIENT)
Dept: NURSING | Facility: CLINIC | Age: 33
End: 2018-09-28

## 2018-09-28 NOTE — TELEPHONE ENCOUNTER
Called and informed pt to try OTC medications first such as Unisom, Tylenol/advil PM or melatonin first before a prescribed medication. Encouraged to call and speak with EULALIO after trying these medications first after a week.  Pt verbalized understanding and no further questions.

## 2018-09-28 NOTE — TELEPHONE ENCOUNTER
"Pt calling in with c/o not being able to sleep since having her surgery a week ago. (hysteroscopy) Denies being in pain. \"I just can't sleep\"  \"I  Would like the dr to call my pharmacist with medicine to help me sleep.\"  Pt is aware that Dr REYES in not in the clinic today.  Routing to Yuki WATSON"

## 2018-10-16 ENCOUNTER — OFFICE VISIT (OUTPATIENT)
Dept: OBGYN | Facility: CLINIC | Age: 33
End: 2018-10-16
Payer: COMMERCIAL

## 2018-10-16 VITALS
BODY MASS INDEX: 26.4 KG/M2 | SYSTOLIC BLOOD PRESSURE: 112 MMHG | DIASTOLIC BLOOD PRESSURE: 62 MMHG | HEIGHT: 63 IN | WEIGHT: 149 LBS

## 2018-10-16 DIAGNOSIS — Z48.816 AFTERCARE FOLLOWING SURGERY OF THE GENITOURINARY SYSTEM: ICD-10-CM

## 2018-10-16 DIAGNOSIS — D25.9 UTERINE LEIOMYOMA, UNSPECIFIED LOCATION: ICD-10-CM

## 2018-10-16 DIAGNOSIS — N71.9 ENDOMETRITIS: Primary | ICD-10-CM

## 2018-10-16 LAB — HGB BLD-MCNC: 10.5 G/DL (ref 11.7–15.7)

## 2018-10-16 PROCEDURE — 36415 COLL VENOUS BLD VENIPUNCTURE: CPT | Performed by: OBSTETRICS & GYNECOLOGY

## 2018-10-16 PROCEDURE — 85018 HEMOGLOBIN: CPT | Performed by: OBSTETRICS & GYNECOLOGY

## 2018-10-16 PROCEDURE — 99214 OFFICE O/P EST MOD 30 MIN: CPT | Performed by: OBSTETRICS & GYNECOLOGY

## 2018-10-16 RX ORDER — DOXYCYCLINE 100 MG/1
100 CAPSULE ORAL 2 TIMES DAILY
Qty: 28 CAPSULE | Refills: 0 | Status: SHIPPED | OUTPATIENT
Start: 2018-10-16 | End: 2018-10-30

## 2018-10-16 NOTE — PROGRESS NOTES
SUBJECTIVE:                                                   Natacha Espinoza is a 33 year old female who presents to clinic today for the following health issue(s):  Patient presents with:  Surgical Followup: 9/21/18: OPERATIVE HYSTEROSCOPY WITH MORCELLATOR (MYOSURE)      Additional information: patient has not been feeling well, no appetite and c/o discharge with odor, some blood    HPI:  Patient is seen for follow-up after her operative hysteroscopy with resection of intrauterine myomas.  She complains of cramping and vaginal discharge with an odor.  She has not noticed a fever.  She also states that she has no appetite and it is difficult for her to eat.  Pathology from her case showed benign myometrial tissue consistent with a fibroid.        Patient Active Problem List   Diagnosis     CARDIOVASCULAR SCREENING; LDL GOAL LESS THAN 160     Irregular bleeding     Tubal occlusion     Primary female infertility     Ovarian cyst     Pelvic pain     Past Surgical History:   Procedure Laterality Date     GYN SURGERY      salpingectomy     OPERATIVE HYSTEROSCOPY WITH MORCELLATOR N/A 9/21/2018    Procedure: OPERATIVE HYSTEROSCOPY WITH MORCELLATOR (MYOSURE);  HYSTEROSCOPY WITH MYOSURE ; RESECTION OF INTRACAVITARY MYOMA . ;  Surgeon: Kole Jesus MD;  Location:  OR      Social History   Substance Use Topics     Smoking status: Never Smoker     Smokeless tobacco: Never Used     Alcohol use No           Current Outpatient Prescriptions   Medication Sig     doxycycline monohydrate 100 MG capsule Take 1 capsule (100 mg) by mouth 2 times daily for 14 days     ibuprofen (ADVIL/MOTRIN) 600 MG tablet Take 1 tablet (600 mg) by mouth every 6 hours as needed for pain (mild)     No current facility-administered medications for this visit.      Allergies   Allergen Reactions     Chloroquine      Other reaction(s): Itching       ROS:  12 point review of systems negative other than symptoms noted below.    OBJECTIVE:     BP  "112/62   5' 3\" (1.6 m)  Wt 149 lb (67.6 kg)  LMP 10/06/2018  BMI 26.39 kg/m2  Body mass index is 26.39 kg/(m^2).    Exam:  Constitutional:  Appearance: Well nourished, well developed alert, in no acute distress  Chest:  Respiratory Effort:  Breathing unlabored  Gastrointestinal:  Abdominal Examination:  Abdomen nontender to palpation, tone normal without rigidity or guarding, no masses present, umbilicus without lesions; Liver/Spleen:  No hepatomegaly present, liver nontender to palpation; Hernias:  No hernias present  Lymphatic: Lymph Nodes:  No other lymphadenopathy present  Skin:General Inspection:  No rashes present, no lesions present, no areas of discoloration; Genitalia and Groin:  No rashes present, no lesions present, no areas of discoloration, no masses present.  Neurologic/Psychiatric:  Mental Status:  Oriented X3   Pelvic Exam:  External Genitalia:     Normal appearance for age, no discharge present, no tenderness present, no inflammatory lesions present, color normal  Vagina:     Normal vaginal vault without central or paravaginal defects, no discharge present, no inflammatory lesions present, no masses present  Bladder:     Nontender to palpation  Urethra:   Urethral Body:  Urethra palpation normal, urethra structural support normal   Urethral Meatus:  No erythema or lesions present  Cervix:     Appearance healthy, tender to motion  Uterus:     Uterus: firm, normal sized and tender to palpation  Adnexa:      no adnexal masses present  Perineum:     Perineum within normal limits, no evidence of trauma, no rashes or skin lesions present  Anus:     Anus within normal limits, no hemorrhoids present  Inguinal Lymph Nodes:     No lymphadenopathy present  Pubic Hair:     Normal pubic hair distribution for age  Genitalia and Groin:     No rashes present, no lesions present, no areas of discoloration, no masses present       In-Clinic Test Results:  Results for orders placed or performed in visit on " 10/16/18 (from the past 24 hour(s))   Hemoglobin   Result Value Ref Range    Hemoglobin 10.5 (L) 11.7 - 15.7 g/dL       ASSESSMENT/PLAN:                                                        ICD-10-CM    1. Endometritis N71.9 doxycycline monohydrate 100 MG capsule   2. Uterine leiomyoma, unspecified location D25.9 US Transvaginal Non OB           Plan: It sounds like patient has a mild endometritis.  I will start with a course of antibiotics.  If not improved patient will return to clinic.  Otherwise patient will schedule an ultrasound in 2 months for follow-up.    Kole Jesus MD  [facility]

## 2018-10-16 NOTE — MR AVS SNAPSHOT
"              After Visit Summary   10/16/2018    Natacha Espinoza    MRN: [id number]           Patient Information     Date Of Birth          1985        Visit Information        Provider Department      10/16/2018 9:45 AM Kole Jesus MD [facility]        Today's Diagnoses     Endometritis    -  1    Uterine leiomyoma, unspecified location           Follow-ups after your visit        Future tests that were ordered for you today     Open Future Orders        Priority Expected Expires Ordered    US Transvaginal Non OB Routine  10/17/2019 10/16/2018            Who to contact     If you have questions or need follow up information about today's clinic visit or your schedule please contact [facility] directly at [phone].  Normal or non-critical lab and imaging results will be communicated to you by [employer], letter or phone within 4 business days after the clinic has received the results. If you do not hear from us within 7 days, please contact the clinic through [employer] or phone. If you have a critical or abnormal lab result, we will notify you by phone as soon as possible.  Submit refill requests through [employer] or call your pharmacy and they will forward the refill request to us. Please allow 3 business days for your refill to be completed.          Additional Information About Your Visit        MyChart Information     [employer] lets you send messages to your doctor, view your test results, renew your prescriptions, schedule appointments and more. To sign up, go to [email or web address] . Click on \"Log in\" on the left side of the screen, which will take you to the Welcome page. Then click on \"Sign up Now\" on the right side of the page.     You will be asked to enter the access code listed below, as well as some personal information. Please follow the directions to create your username and password.     Your access code is: [email or web address]  Expires: 12/16/2018  1:53 " "PM     Your access code will  in 90 days. If you need help or a new code, please call your [place] clinic or [phone].        Care EveryWhere ID     This is your Care EveryWhere ID. This could be used by other organizations to access your [place] medical records  [id number]        Your Vitals Were     Height Last Period BMI (Body Mass Index)             5' 3\" (1.6 m) 10/06/2018 26.39 kg/m2          Blood Pressure from Last 3 Encounters:   10/16/18 112/62   18 113/67   18 112/70    Weight from Last 3 Encounters:   10/16/18 149 lb (67.6 kg)   18 152 lb (68.9 kg)   18 152 lb (68.9 kg)                 Today's Medication Changes          These changes are accurate as of 10/16/18 10:00 AM.  If you have any questions, ask your nurse or doctor.               Start taking these medicines.        Dose/Directions    doxycycline monohydrate 100 MG capsule   Used for:  Endometritis   Started by:  Kole Jesus MD        Dose:  100 mg   Take 1 capsule (100 mg) by mouth 2 times daily for 14 days   Quantity:  28 capsule   Refills:  0            Where to get your medicines      These medications were sent to [facility] [address] [place] [address]  [address] [facility] [address]    Hours:  24-hours Phone:  [phone]     doxycycline monohydrate 100 MG capsule                Primary Care Provider Office Phone # Fax #    [facility] [phone]       [address] [facility]  [facility] [address]        Equal Access to Services     CON HEREDIA : Macie Walsh, alejo trotter, qajs kaalmada rahul, brian ng. So [facility] [phone].    ATENCIÓN: Si habla español, tiene a jimenez disposición servicios gratuitos de asistencia lingüística. [place] al [phone].    We comply with applicable " federal civil rights laws and [place] laws. We do not discriminate on the basis of race, color, national origin, age, disability, sex, sexual orientation, or gender identity.            Thank you!     Thank you for choosing [facility]  for your care. Our goal is always to provide you with excellent care. Hearing back from our patients is one way we can continue to improve our services. Please take a few minutes to complete the written survey that you may receive in the mail after your visit with us. Thank you!             Your Updated Medication List - Protect others around you: Learn how to safely use, store and throw away your medicines at www.disposemymeds.org.          This list is accurate as of 10/16/18 10:00 AM.  Always use your most recent med list.                   Brand Name Dispense Instructions for use Diagnosis    doxycycline monohydrate 100 MG capsule     28 capsule    Take 1 capsule (100 mg) by mouth 2 times daily for 14 days    Endometritis       ibuprofen 600 MG tablet    ADVIL/MOTRIN    30 tablet    Take 1 tablet (600 mg) by mouth every 6 hours as needed for pain (mild)    Post-op pain

## 2018-10-31 ENCOUNTER — TELEPHONE (OUTPATIENT)
Dept: OBGYN | Facility: CLINIC | Age: 33
End: 2018-10-31

## 2018-10-31 DIAGNOSIS — G89.18 POST-OP PAIN: ICD-10-CM

## 2018-10-31 RX ORDER — IBUPROFEN 600 MG/1
600 TABLET, FILM COATED ORAL EVERY 6 HOURS PRN
Qty: 30 TABLET | Refills: 0 | Status: SHIPPED | OUTPATIENT
Start: 2018-10-31 | End: 2018-11-06

## 2018-10-31 NOTE — TELEPHONE ENCOUNTER
Pt calling for refill on Ibuprofen  She is having discomfort right under her naval and she has run out of her Ibuprofen 600 mg Rx    Instructed pt Ibuprofen can be obtained OTC for discomfort and can take 3 tablets every 6 hours for discomfort. If pain is worse, should be evaluated. Pt states this is the same pain she has experienced since her surgery 9/21/18. Had post op f/u w EULALIO 10/16/18 and was told she could call if needs more medication of her Ibuprofen.  Refill request approved x1 and instructed to go to OTC Ibuprofen after refill completed. Pt verbalized understanding. No further questions

## 2018-11-06 ENCOUNTER — OFFICE VISIT (OUTPATIENT)
Dept: OBGYN | Facility: CLINIC | Age: 33
End: 2018-11-06
Payer: COMMERCIAL

## 2018-11-06 ENCOUNTER — TELEPHONE (OUTPATIENT)
Dept: NURSING | Facility: CLINIC | Age: 33
End: 2018-11-06

## 2018-11-06 VITALS
SYSTOLIC BLOOD PRESSURE: 108 MMHG | DIASTOLIC BLOOD PRESSURE: 54 MMHG | WEIGHT: 151 LBS | BODY MASS INDEX: 26.75 KG/M2 | HEIGHT: 63 IN | HEART RATE: 64 BPM

## 2018-11-06 DIAGNOSIS — R10.2 FEMALE PELVIC PAIN: ICD-10-CM

## 2018-11-06 DIAGNOSIS — G89.18 POST-OP PAIN: ICD-10-CM

## 2018-11-06 DIAGNOSIS — B80 PINWORMS: Primary | ICD-10-CM

## 2018-11-06 PROCEDURE — 99213 OFFICE O/P EST LOW 20 MIN: CPT | Performed by: OBSTETRICS & GYNECOLOGY

## 2018-11-06 RX ORDER — IBUPROFEN 600 MG/1
600 TABLET, FILM COATED ORAL EVERY 6 HOURS PRN
Qty: 40 TABLET | Refills: 0 | Status: SHIPPED | OUTPATIENT
Start: 2018-11-06

## 2018-11-06 NOTE — PROGRESS NOTES
SUBJECTIVE:                                                   Natacha Espinoza is a 33 year old female who presents to clinic today for the following health issue(s):  Patient presents with:  Pelvic Pain: Started       HPI:  Patient is seen for severe rectal itching and discomfort.  Her whole family was recently diagnosed with pinworms.  She is also having her period now and has severe dysmenorrhea.    No LMP recorded..   Patient is sexually active, .  Using none for contraception.    reports that she has never smoked. She has never used smokeless tobacco.    STD testing offered?  Declined    Health maintenance updated:  yes    Today's PHQ-2 Score:   PHQ-2 (  [employer]) 2013   Q1: Little interest or pleasure in doing things 0   Q2: Feeling down, depressed or hopeless 0   PHQ-2 Score 0     Today's PHQ-9 Score: No flowsheet data found.  Today's CHRISTIN-7 Score: No flowsheet data found.    Problem list and histories reviewed & adjusted, as indicated.  Additional history: as documented.    Patient Active Problem List   Diagnosis     CARDIOVASCULAR SCREENING; LDL GOAL LESS THAN 160     Irregular bleeding     Tubal occlusion     Primary female infertility     Ovarian cyst     Pelvic pain     Past Surgical History:   Procedure Laterality Date     GYN SURGERY      salpingectomy     OPERATIVE HYSTEROSCOPY WITH MORCELLATOR N/A 2018    Procedure: OPERATIVE HYSTEROSCOPY WITH MORCELLATOR (MYOSURE);  HYSTEROSCOPY WITH MYOSURE ; RESECTION OF INTRACAVITARY MYOMA . ;  Surgeon: Kole Jesus MD;  Location:  OR      Social History   Substance Use Topics     Smoking status: Never Smoker     Smokeless tobacco: Never Used     Alcohol use No           Current Outpatient Prescriptions   Medication Sig     ibuprofen (ADVIL/MOTRIN) 600 MG tablet Take 1 tablet (600 mg) by mouth every 6 hours as needed for pain (mild)     Pyrantel Pamoate 180 MG TABS Take 180 mg/day by mouth daily     No current  "facility-administered medications for this visit.      Allergies   Allergen Reactions     Chloroquine      Other reaction(s): Itching       ROS:  12 point review of systems negative other than symptoms noted below.  Genitourinary: Pelvic Pain    OBJECTIVE:     /54  Pulse 64  Ht 5' 3\" (1.6 m)  Wt 151 lb (68.5 kg)  BMI 26.75 kg/m2  Body mass index is 26.75 kg/(m^2).    Exam:  Constitutional:  Appearance: Well nourished, well developed alert, in no acute distress  Chest:  Respiratory Effort:  Breathing unlabored  Neurologic/Psychiatric:  Mental Status:  Oriented X3   No Pelvic Exam performed     In-Clinic Test Results:  No results found for this or any previous visit (from the past 24 hour(s)).    ASSESSMENT/PLAN:                                                        ICD-10-CM    1. Pinworms B80 Pyrantel Pamoate 180 MG TABS   2. Post-op pain G89.18    3. Female pelvic pain R10.2 ibuprofen (ADVIL/MOTRIN) 600 MG tablet           Plan the patient is scheduled to return to clinic in a couple months for repeat ultrasound.    Kole Jesus MD  [facility] [place]  "

## 2018-11-06 NOTE — MR AVS SNAPSHOT
"              After Visit Summary   2018    Natacha Espinoza    MRN: [id number]           Patient Information     Date Of Birth          1985        Visit Information        Provider Department      2018 9:00 AM Kole Jesus MD [facility]        Today's Diagnoses     Pinworms    -  1    Post-op pain        Female pelvic pain           Follow-ups after your visit        Who to contact     If you have questions or need follow up information about today's clinic visit or your schedule please contact [facility] directly at [phone].  Normal or non-critical lab and imaging results will be communicated to you by [employer], letter or phone within 4 business days after the clinic has received the results. If you do not hear from us within 7 days, please contact the clinic through [employer] or phone. If you have a critical or abnormal lab result, we will notify you by phone as soon as possible.  Submit refill requests through [employer] or call your pharmacy and they will forward the refill request to us. Please allow 3 business days for your refill to be completed.          Additional Information About Your Visit        MyChart Information     [employer] lets you send messages to your doctor, view your test results, renew your prescriptions, schedule appointments and more. To sign up, go to [email or web address] . Click on \"Log in\" on the left side of the screen, which will take you to the Welcome page. Then click on \"Sign up Now\" on the right side of the page.     You will be asked to enter the access code listed below, as well as some personal information. Please follow the directions to create your username and password.     Your access code is: [email or web address]  Expires: 2018 12:53 PM     Your access code will  in 90 days. If you need help or a new code, please call your [facility] or [phone].        Care EveryWhere ID     This is your Care " "EveryWhere ID. This could be used by other organizations to access your [place] medical records  [id number]        Your Vitals Were     Pulse Height BMI (Body Mass Index)             64 5' 3\" (1.6 m) 26.75 kg/m2          Blood Pressure from Last 3 Encounters:   11/06/18 108/54   10/16/18 112/62   09/21/18 113/67    Weight from Last 3 Encounters:   11/06/18 151 lb (68.5 kg)   10/16/18 149 lb (67.6 kg)   09/21/18 152 lb (68.9 kg)              Today, you had the following     No orders found for display         Today's Medication Changes          These changes are accurate as of 11/6/18 10:06 AM.  If you have any questions, ask your nurse or doctor.               Start taking these medicines.        Dose/Directions    Pyrantel Pamoate 180 MG Tabs   Used for:  Pinworms   Started by:  Kole Jesus MD        Dose:  180 mg/day   Take 180 mg/day by mouth daily   Quantity:  8 tablet   Refills:  0            Where to get your medicines      These medications were sent to [facility] [address] [place] [address] [address] [facility]  [address] [facility] [address]    Hours:  24-hours Phone:  [phone]     ibuprofen 600 MG tablet    Pyrantel Pamoate 180 MG Tabs                Primary Care Provider Office Phone # Fax #    [facility] [phone]       [place] [address]  [address] [address]        Equal Access to Services     YESSY HEREDIA AH: Hadii mehrdad hansen hadashjackie Sodarion, waaxda luqadaha, qaybta kaalmada adealtagracia, brian ng. So [facility] [phone].    ATENCIÓN: Si habla español, tiene a jimenez disposición servicios gratuitos de asistencia lingüística. Llame al [phone].    We comply with applicable federal civil rights laws and [place] laws. We do not discriminate on the basis of race, color, national origin, age, disability, sex, sexual " orientation, or gender identity.            Thank you!     Thank you for choosing [facility]  for your care. Our goal is always to provide you with excellent care. Hearing back from our patients is one way we can continue to improve our services. Please take a few minutes to complete the written survey that you may receive in the mail after your visit with us. Thank you!             Your Updated Medication List - Protect others around you: Learn how to safely use, store and throw away your medicines at www.disposemymeds.org.          This list is accurate as of 11/6/18 10:06 AM.  Always use your most recent med list.                   Brand Name Dispense Instructions for use Diagnosis    ibuprofen 600 MG tablet    ADVIL/MOTRIN    40 tablet    Take 1 tablet (600 mg) by mouth every 6 hours as needed for pain (mild)    Female pelvic pain       Pyrantel Pamoate 180 MG Tabs     8 tablet    Take 180 mg/day by mouth daily    Pinworms

## 2018-11-20 ENCOUNTER — OFFICE VISIT (OUTPATIENT)
Dept: FAMILY MEDICINE | Facility: CLINIC | Age: 33
End: 2018-11-20
Payer: COMMERCIAL

## 2018-11-20 VITALS
OXYGEN SATURATION: 100 % | TEMPERATURE: 98.1 F | SYSTOLIC BLOOD PRESSURE: 102 MMHG | DIASTOLIC BLOOD PRESSURE: 58 MMHG | HEIGHT: 63 IN | WEIGHT: 152 LBS | BODY MASS INDEX: 26.93 KG/M2 | HEART RATE: 81 BPM

## 2018-11-20 DIAGNOSIS — N64.4 BREAST PAIN, LEFT: Primary | ICD-10-CM

## 2018-11-20 PROCEDURE — 99213 OFFICE O/P EST LOW 20 MIN: CPT | Performed by: PREVENTIVE MEDICINE

## 2018-11-20 ASSESSMENT — PAIN SCALES - GENERAL: PAINLEVEL: NO PAIN (0)

## 2018-11-20 NOTE — PATIENT INSTRUCTIONS
At [facility], we strive to deliver an exceptional experience to you, every time we see you.  If you receive a survey in the mail, please send us back your thoughts. We really do value your feedback.    Your care team:                            Family Medicine Internal Medicine   MD Boris Mccloud MD Shantel Branch-Fleming, MD Katya Georgiev PA-C Megan Hill, APRN MIGUEL Rodas MD Pediatrics   Dylon Sanchez, APRIL Holguin, MD Imelda Maldonado APRN CNP   MD Shanice Goodman MD Deborah Mielke, MD Milagros Logan, APRN [facility]      Clinic hours: Monday - Thursday 7 am-7 pm; Fridays 7 am-5 pm.   Urgent care: Monday - Friday 11 am-9 pm; Saturday and Sunday 9 am-5 pm.  Pharmacy : Monday -Thursday 8 am-8 pm; Friday 8 am-6 pm; Saturday and Sunday 9 am-5 pm.     Clinic: [phone]   Pharmacy: [phone]

## 2018-11-20 NOTE — MR AVS SNAPSHOT
After Visit Summary   11/20/2018    Natacha Espinoza    MRN: [id number]           Patient Information     Date Of Birth          1985        Visit Information        Provider Department      11/20/2018 11:40 AM Sun Luna MD [facility]        Today's Diagnoses     Breast pain, left    -  1      Care Instructions    At [facility], we strive to deliver an exceptional experience to you, every time we see you.  If you receive a survey in the mail, please send us back your thoughts. We really do value your feedback.    Your care team:                            Family Medicine Internal Medicine   MD Boris Mccloud MD Shantel Branch-Fleming, MD Katya Georgiev PA-C Megan Hill, APRSALOMON Rodas MD Pediatrics   Dylon Sanchez, APRIL Holguin, MD Imelda Maldonado APRN CNP   MD Shanice Goodman MD Deborah Mielke, MD Kim Thein, [facility]      Clinic hours: Monday - Thursday 7 am-7 pm; Fridays 7 am-5 pm.   Urgent care: Monday - Friday 11 am-9 pm; Saturday and Sunday 9 am-5 pm.  Pharmacy : Monday -Thursday 8 am-8 pm; Friday 8 am-6 pm; Saturday and Sunday 9 am-5 pm.     Clinic: [phone]   Pharmacy: [phone]                Follow-ups after your visit        Future tests that were ordered for you today     Open Future Orders        Priority Expected Expires Ordered    MA Diagnostic Digital Left Routine  11/20/2019 11/20/2018    US Breast Left Limited 1-3 Quadrants Routine  11/20/2019 11/20/2018            Who to contact     If you have questions or need follow up information about today's clinic visit or your schedule please contact [facility] directly at [phone].  Normal or non-critical lab and imaging results will be communicated to you by MyChart, letter or phone within 4 business days after the clinic has received the results. If you do not hear from us within 7 days,  "please contact the clinic through [employer] or phone. If you have a critical or abnormal lab result, we will notify you by phone as soon as possible.  Submit refill requests through [employer] or call your pharmacy and they will forward the refill request to us. Please allow 3 business days for your refill to be completed.          Additional Information About Your Visit        Care EveryWhere ID     This is your Care EveryWhere ID. This could be used by other organizations to access your [place] medical records  [id number]        Your Vitals Were     Pulse Temperature Height Last Period Pulse Oximetry Breastfeeding?    81 98.1  F (36.7  C) (Oral) 5' 3\" (1.6 m) 10/30/2018 (Exact Date) 100% No    BMI (Body Mass Index)                   26.93 kg/m2            Blood Pressure from Last 3 Encounters:   11/20/18 102/58   11/06/18 108/54   10/16/18 112/62    Weight from Last 3 Encounters:   11/20/18 152 lb (68.9 kg)   11/06/18 151 lb (68.5 kg)   10/16/18 149 lb (67.6 kg)               Primary Care Provider Office Phone # Fax #    [facility] [phone]       [place] [address]  [facility] [address]  [facility] [address]        Equal Access to Services     CON HEREDIA : Hadii aad ku hadasho Soomaali, waaxda luqadaha, qaybta kaalmada adeegyada, waxay idiin hayaan karolyn cox . So [facility] [phone].    ATENCIÓN: Si habla español, tiene a jimenez disposición servicios gratuitos de asistencia lingüística. Llame al [phone].    We comply with applicable federal civil rights laws and [place] laws. We do not discriminate on the basis of race, color, national origin, age, disability, sex, sexual orientation, or gender identity.            Thank you!     Thank you for choosing [facility]  for your care. Our goal is always to provide you with excellent care. Hearing back from our patients is one way we can continue to improve our services. " Please take a few minutes to complete the written survey that you may receive in the mail after your visit with us. Thank you!             Your Updated Medication List - Protect others around you: Learn how to safely use, store and throw away your medicines at www.disposemymeds.org.          This list is accurate as of 11/20/18 11:51 AM.  Always use your most recent med list.                   Brand Name Dispense Instructions for use Diagnosis    ibuprofen 600 MG tablet    ADVIL/MOTRIN    40 tablet    Take 1 tablet (600 mg) by mouth every 6 hours as needed for pain (mild)    Female pelvic pain

## 2018-11-20 NOTE — PROGRESS NOTES
SUBJECTIVE:   Natacha Espinoza is a 33 year old female who presents to clinic today for the following health issues:      Pain of Left Breast       Duration: x 2 months    Description (location/character/radiation): left breast    Intensity:  mild    Accompanying signs and symptoms: none    History (similar episodes/previous evaluation): None    Precipitating or alleviating factors: None    Therapies tried and outcome: None   Sharp pain  No family history of breast cancer  Not breastfeeding  No lumps felt  Periods are irregular  No definite association with menses   No medication taken  Caffeine 1 serving per day   No trauma   No skin changes,no erythema or bruising     Problem list and histories reviewed & adjusted, as indicated.  Additional history: as documented    Patient Active Problem List   Diagnosis     CARDIOVASCULAR SCREENING; LDL GOAL LESS THAN 160     Irregular bleeding     Tubal occlusion     Primary female infertility     Ovarian cyst     Pelvic pain     Past Surgical History:   Procedure Laterality Date     GYN SURGERY      salpingectomy     OPERATIVE HYSTEROSCOPY WITH MORCELLATOR N/A 9/21/2018    Procedure: OPERATIVE HYSTEROSCOPY WITH MORCELLATOR (MYOSURE);  HYSTEROSCOPY WITH MYOSURE ; RESECTION OF INTRACAVITARY MYOMA . ;  Surgeon: Kole Jesus MD;  Location:  OR       Social History   Substance Use Topics     Smoking status: Never Smoker     Smokeless tobacco: Never Used     Alcohol use No     No family history on file.      Current Outpatient Prescriptions   Medication Sig Dispense Refill     ibuprofen (ADVIL/MOTRIN) 600 MG tablet Take 1 tablet (600 mg) by mouth every 6 hours as needed for pain (mild) 40 tablet 0     Allergies   Allergen Reactions     Chloroquine      Other reaction(s): Itching     BP Readings from Last 3 Encounters:   11/20/18 102/58   11/06/18 108/54   10/16/18 112/62    Wt Readings from Last 3 Encounters:   11/20/18 152 lb (68.9 kg)   11/06/18 151 lb (68.5 kg)   10/16/18  "149 lb (67.6 kg)                  Labs reviewed in EPIC    Reviewed and updated as needed this visit by clinical staff       Reviewed and updated as needed this visit by Provider         ROS:  Constitutional, HEENT, cardiovascular, pulmonary, gi and gu systems are negative, except as otherwise noted.    OBJECTIVE:                                                    /58  Pulse 81  Temp 98.1  F (36.7  C) (Oral)  Ht 5' 3\" (1.6 m)  Wt 152 lb (68.9 kg)  LMP 10/30/2018 (Exact Date)  SpO2 100%  Breastfeeding? No  BMI 26.93 kg/m2  Body mass index is 26.93 kg/(m^2).  GENERAL APPEARANCE: healthy, alert and no distress  EYES: Eyes grossly normal to inspection and conjunctivae and sclerae normal  NECK: trachea midline and normal to palpation  RESP: lungs clear to auscultation - no rales, rhonchi or wheezes  BREAST: normal without masses, tenderness or nipple discharge and no palpable axillary masses or adenopathy  CV: regular rates and rhythm, normal S1 S2, no S3 or S4 and no murmur, click or rub  ABDOMEN: soft, non-tender  SKIN: no suspicious lesions or rashes  NEURO: Normal strength and tone, mentation intact and speech normal  PSYCH: mentation appears normal and affect normal/bright    Diagnostic test results:  Diagnostic Test Results:  No results found for this or any previous visit (from the past 24 hour(s)).     ASSESSMENT/PLAN:                                                    1. Breast pain, left  -Await results of imaging   - US Breast Left Limited 1-3 Quadrants; Future  - MA Diagnostic Digital Left; Future      Follow up with Provider - scheduled for physical and pap smear      Sun Luna MD MPH    [facility]  "

## 2018-12-19 ENCOUNTER — TELEPHONE (OUTPATIENT)
Dept: OBGYN | Facility: CLINIC | Age: 33
End: 2018-12-19

## 2018-12-19 NOTE — TELEPHONE ENCOUNTER
Spoke with patient, she states that she currently has no insurance and hasn't a pap in a long time. Will call to schedule appt once she has active medical insurance.

## 2019-04-12 NOTE — TELEPHONE ENCOUNTER
2nd attempt. Spoke to patient, she states that she will make an appt to have a pap smear done next month with Dr Jesus. Still no active insurance.

## 2019-06-25 ENCOUNTER — TELEPHONE (OUTPATIENT)
Dept: OBGYN | Facility: CLINIC | Age: 34
End: 2019-06-25

## 2019-06-25 NOTE — TELEPHONE ENCOUNTER
Panel Management Review    Date of last visit with a [place] provider: Dr Jesus on 11/06/18.  Date of next visit with a [place] provider: None.    Health Maintenance List    Health Maintenance   Topic Date Due     PREVENTIVE CARE VISIT  1985     PAP  04/18/2016     PHQ-2  01/01/2019     DTAP/TDAP/TD IMMUNIZATION (3 - Td) 03/24/2019     INFLUENZA VACCINE (Season Ended) 09/01/2019     HIV SCREENING  Completed     IPV IMMUNIZATION  Aged Out     MENINGITIS IMMUNIZATION  Aged Out       Composite cancer screening  Chart review shows that this patient is due/due soon for the following Pap Smear  Lab Results   Component Value Date    PAP NIL 04/18/2013     Past Surgical History:   Procedure Laterality Date     GYN SURGERY      salpingectomy     OPERATIVE HYSTEROSCOPY WITH MORCELLATOR N/A 9/21/2018    Procedure: OPERATIVE HYSTEROSCOPY WITH MORCELLATOR (MYOSURE);  HYSTEROSCOPY WITH MYOSURE ; RESECTION OF INTRACAVITARY MYOMA . ;  Surgeon: Kole Jesus MD;  Location:  OR       Is hysterectomy listed in surgical history? No   Is mastectomy listed in surgical history? No     Summary:    Patient is due/failing the following:   Pap Smear    Action needed: Patient needs office visit for pap smear.    Type of outreach:  Phone, left message for patient to call back.       Staff Signature:  Bia Garcia CMA

## 2021-03-02 ENCOUNTER — VIRTUAL VISIT (OUTPATIENT)
Dept: FAMILY MEDICINE | Facility: CLINIC | Age: 36
End: 2021-03-02
Payer: COMMERCIAL

## 2021-03-02 ENCOUNTER — OFFICE VISIT (OUTPATIENT)
Dept: URGENT CARE | Facility: URGENT CARE | Age: 36
End: 2021-03-02
Attending: FAMILY MEDICINE
Payer: COMMERCIAL

## 2021-03-02 VITALS — BODY MASS INDEX: 26.93 KG/M2 | HEIGHT: 63 IN

## 2021-03-02 DIAGNOSIS — Z71.84 TRAVEL ADVICE ENCOUNTER: ICD-10-CM

## 2021-03-02 DIAGNOSIS — Z71.84 TRAVEL ADVICE ENCOUNTER: Primary | ICD-10-CM

## 2021-03-02 LAB
SARS-COV-2 RNA RESP QL NAA+PROBE: NORMAL
SPECIMEN SOURCE: NORMAL

## 2021-03-02 PROCEDURE — U0005 INFEC AGEN DETEC AMPLI PROBE: HCPCS | Mod: CS | Performed by: FAMILY MEDICINE

## 2021-03-02 PROCEDURE — U0003 INFECTIOUS AGENT DETECTION BY NUCLEIC ACID (DNA OR RNA); SEVERE ACUTE RESPIRATORY SYNDROME CORONAVIRUS 2 (SARS-COV-2) (CORONAVIRUS DISEASE [COVID-19]), AMPLIFIED PROBE TECHNIQUE, MAKING USE OF HIGH THROUGHPUT TECHNOLOGIES AS DESCRIBED BY CMS-2020-01-R: HCPCS | Mod: CS | Performed by: FAMILY MEDICINE

## 2021-03-02 PROCEDURE — 99213 OFFICE O/P EST LOW 20 MIN: CPT | Mod: 95 | Performed by: FAMILY MEDICINE

## 2021-03-02 NOTE — PROGRESS NOTES
Natacha is a 35 year old who is being evaluated via a billable telephone visit.      What phone number would you like to be contacted at? [phone]   How would you like to obtain your AVS? Mail a copy    Assessment & Plan     Travel advice encounter  Patient needs to set up Covid testing for travel on Friday.  So I cannot give her a guarantee but recently testing has been able to be done within a day or 2 and results back fairly quickly.  So would like to get this set up today or tomorrow morning at the latest.  - Asymptomatic COVID-19 Virus (Coronavirus) by PCR; Future       See Patient Instructions    Return in about 2 days (around 3/4/2021) for Based upon lab results.    Jo-Ann Reno MD  [facility]       Pt would like an order for covid PCR swab for traveling.      Spoke with patient today about setting up Covid testing.  No known exposure or symptoms but is traveling internationally in a few days and needs to have this done.    Review of Systems   Constitutional, HEENT, cardiovascular, pulmonary, gi and gu systems are negative, except as otherwise noted.      Objective           Vitals:  No vitals were obtained today due to virtual visit.    Physical Exam   healthy, alert and no distress  PSYCH: Alert and oriented times 3; coherent speech, normal   rate and volume, able to articulate logical thoughts, able   to abstract reason, no tangential thoughts, no hallucinations   or delusions  Her affect is normal  RESP: No cough, no audible wheezing, able to talk in full sentences  Remainder of exam unable to be completed due to telephone visits    Past labs reviewed with the patient.             Phone call duration: 8 minutes

## 2021-03-03 LAB
LABORATORY COMMENT REPORT: NORMAL
SARS-COV-2 RNA RESP QL NAA+PROBE: NEGATIVE
SPECIMEN SOURCE: NORMAL

## 2021-05-17 ENCOUNTER — OFFICE VISIT (OUTPATIENT)
Dept: FAMILY MEDICINE | Facility: CLINIC | Age: 36
End: 2021-05-17
Payer: COMMERCIAL

## 2021-05-17 VITALS
BODY MASS INDEX: 26.75 KG/M2 | DIASTOLIC BLOOD PRESSURE: 62 MMHG | OXYGEN SATURATION: 99 % | WEIGHT: 151 LBS | HEART RATE: 76 BPM | HEIGHT: 63 IN | SYSTOLIC BLOOD PRESSURE: 103 MMHG

## 2021-05-17 DIAGNOSIS — N64.4 NIPPLE PAIN: Primary | ICD-10-CM

## 2021-05-17 PROBLEM — G08 CEREBRAL VENOUS SINUS THROMBOSIS: Status: ACTIVE | Noted: 2021-05-06

## 2021-05-17 PROCEDURE — 99213 OFFICE O/P EST LOW 20 MIN: CPT | Performed by: NURSE PRACTITIONER

## 2021-05-17 RX ORDER — ACETAZOLAMIDE 250 MG/1
TABLET ORAL
COMMUNITY
Start: 2021-04-22

## 2021-05-17 RX ORDER — APIXABAN 5 MG/1
5 TABLET, FILM COATED ORAL 2 TIMES DAILY
COMMUNITY
Start: 2021-04-16

## 2021-05-17 RX ORDER — GABAPENTIN 100 MG/1
CAPSULE ORAL
COMMUNITY
Start: 2021-05-11

## 2021-05-17 ASSESSMENT — PAIN SCALES - GENERAL: PAINLEVEL: NO PAIN (0)

## 2021-05-17 ASSESSMENT — MIFFLIN-ST. JEOR: SCORE: 1359.06

## 2021-05-17 NOTE — PATIENT INSTRUCTIONS
At [facility], we strive to deliver an exceptional experience to you, every time we see you. If you receive a survey, please complete it as we do value your feedback.  If you have MyChart, you can expect to receive results automatically within 24 hours of their completion.  Your provider will send a note interpreting your results as well.   If you do not have MyChart, you should receive your results in about a week by mail.    Your care team:                            Family Medicine Internal Medicine   MD Boris Mccloud MD Shantel Branch-Fleming, MD Srinivasa Vaka, MD Katya Belousova, PAMARY LOU Payton, APRN CNP    Curtis Rodas, MD Pediatrics   Dylon Sanchez, PAMARY LOU Holguin, CNP MD Imelda Adams APRN CNP   MD Shanice Goodman MD Deborah Mielke, MD Milagros Logan, APRN [facility]      Clinic hours: Monday - Thursday 7 am-6 pm; Fridays 7 am-5 pm.   Urgent care: Monday - Friday 10 am- 8 pm; Saturday and Sunday 9 am-5 pm.    Clinic: [phone]       [facility]: Monday - Thursday 8 am - 7 pm; Friday 8 am - 6 pm  [facility]: [phone]     Use [email or web address] for 24/7 diagnosis and treatment of dozens of conditions.

## 2021-05-17 NOTE — PROGRESS NOTES
"    Assessment & Plan     Nipple pain  Exam WNL.  Patient still on menses.  If pain does not resolve 1 week after menses, contact me for imaging referral.  Also discussed other concerning signs and symptoms to be aware of.          15 minutes spent on the date of the encounter doing chart review, history and exam, documentation and further activities per the note       BMI:   Estimated body mass index is 26.75 kg/m  as calculated from the following:    Height as of this encounter: 1.6 m (5' 3\").    Weight as of this encounter: 68.5 kg (151 lb).         Return in about 1 week (around 5/24/2021), or if symptoms worsen or fail to improve.    MARSHA Maguire CNP  [facility] LEELEE Manzano is a 35 year old who presents for the following health issues     HPI     Breast Concern  Onset/Duration: about 1 week ago  Description:   Location: nipple  Pain or tenderness:  pain in nipple   Redness:  no   Intensity: moderate  Progression of Symptoms: same and intermittent  Accompanying Signs & Symptoms:  Any lumps in axillary region:  no   Movable:  no   Nipple discharge: none  Changes in the skin or nipple: none  On Hormone therapy:  no   Does it change with menstrual cycle: YES  Previous history of similar problem:   First degree relative with breast cancer: none  Precipitating factors:           Worsened by: around menstrual cycle  Alleviating factors:            Improved by: none  Therapies tried and outcome: None  Patient's last menstrual period was 05/13/2021.      Review of Systems   Constitutional, HEENT, cardiovascular, pulmonary, gi and gu systems are negative, except as otherwise noted.      Objective    /62 (BP Location: Left arm, Patient Position: Chair, Cuff Size: Adult Regular)   Pulse 76   Ht 1.6 m (5' 3\")   Wt 68.5 kg (151 lb)   LMP 05/13/2021   SpO2 99%   BMI 26.75 kg/m    Body mass index is 26.75 kg/m .  Physical Exam   GENERAL: healthy, alert and no " distress  BREAST: normal without masses, tenderness or nipple discharge and no palpable axillary masses or adenopathy

## 2022-03-14 ENCOUNTER — OFFICE VISIT (OUTPATIENT)
Dept: OBGYN | Facility: CLINIC | Age: 37
End: 2022-03-14
Payer: COMMERCIAL

## 2022-03-14 VITALS
DIASTOLIC BLOOD PRESSURE: 62 MMHG | OXYGEN SATURATION: 99 % | BODY MASS INDEX: 26.38 KG/M2 | HEART RATE: 85 BPM | SYSTOLIC BLOOD PRESSURE: 107 MMHG | WEIGHT: 148.9 LBS

## 2022-03-14 DIAGNOSIS — Z11.3 SCREEN FOR STD (SEXUALLY TRANSMITTED DISEASE): ICD-10-CM

## 2022-03-14 DIAGNOSIS — N92.0 MENORRHAGIA WITH REGULAR CYCLE: Primary | ICD-10-CM

## 2022-03-14 DIAGNOSIS — Z12.4 SCREENING FOR MALIGNANT NEOPLASM OF CERVIX: ICD-10-CM

## 2022-03-14 DIAGNOSIS — N94.6 DYSMENORRHEA: ICD-10-CM

## 2022-03-14 LAB
ERYTHROCYTE [DISTWIDTH] IN BLOOD BY AUTOMATED COUNT: 21 % (ref 10–15)
FERRITIN SERPL-MCNC: 5 NG/ML (ref 12–150)
HCT VFR BLD AUTO: 28.1 % (ref 35–47)
HGB BLD-MCNC: 8 G/DL (ref 11.7–15.7)
MCH RBC QN AUTO: 19.1 PG (ref 26.5–33)
MCHC RBC AUTO-ENTMCNC: 28.5 G/DL (ref 31.5–36.5)
MCV RBC AUTO: 67 FL (ref 78–100)
PLATELET # BLD AUTO: 490 10E3/UL (ref 150–450)
RBC # BLD AUTO: 4.18 10E6/UL (ref 3.8–5.2)
TSH SERPL DL<=0.005 MIU/L-ACNC: 0.78 MU/L (ref 0.4–4)
WBC # BLD AUTO: 5.7 10E3/UL (ref 4–11)

## 2022-03-14 PROCEDURE — 36415 COLL VENOUS BLD VENIPUNCTURE: CPT | Performed by: OBSTETRICS & GYNECOLOGY

## 2022-03-14 PROCEDURE — G0145 SCR C/V CYTO,THINLAYER,RESCR: HCPCS | Performed by: OBSTETRICS & GYNECOLOGY

## 2022-03-14 PROCEDURE — 87591 N.GONORRHOEAE DNA AMP PROB: CPT | Performed by: OBSTETRICS & GYNECOLOGY

## 2022-03-14 PROCEDURE — 99203 OFFICE O/P NEW LOW 30 MIN: CPT | Performed by: OBSTETRICS & GYNECOLOGY

## 2022-03-14 PROCEDURE — 84443 ASSAY THYROID STIM HORMONE: CPT | Performed by: OBSTETRICS & GYNECOLOGY

## 2022-03-14 PROCEDURE — 87491 CHLMYD TRACH DNA AMP PROBE: CPT | Performed by: OBSTETRICS & GYNECOLOGY

## 2022-03-14 PROCEDURE — 82728 ASSAY OF FERRITIN: CPT | Performed by: OBSTETRICS & GYNECOLOGY

## 2022-03-14 PROCEDURE — 87624 HPV HI-RISK TYP POOLED RSLT: CPT | Performed by: OBSTETRICS & GYNECOLOGY

## 2022-03-14 PROCEDURE — 85027 COMPLETE CBC AUTOMATED: CPT | Performed by: OBSTETRICS & GYNECOLOGY

## 2022-03-14 RX ORDER — ACETAMINOPHEN 500 MG
500-1000 TABLET ORAL EVERY 6 HOURS PRN
Qty: 60 TABLET | Refills: 1 | Status: SHIPPED | OUTPATIENT
Start: 2022-03-14

## 2022-03-14 RX ORDER — ACETAMINOPHEN 500 MG
TABLET ORAL
COMMUNITY
Start: 2022-02-17

## 2022-03-14 RX ORDER — NORETHINDRONE ACETATE 5 MG
5 TABLET ORAL DAILY
Qty: 90 TABLET | Refills: 0 | Status: SHIPPED | OUTPATIENT
Start: 2022-03-14 | End: 2022-07-17

## 2022-03-14 NOTE — PROGRESS NOTES
SUBJECTIVE:       HPI: Natacha Espinoza is a 36 year old  who presents today for presents today for consultation regarding AUB, referral from KAYLA Sutton.    Patient c/o heavy menstrual bleeding since starting Eliquis. Regular menses every 28 days, lasting 4 days.  Periods associated with severe cramping. No lightheadedness. Goes through pads almost every 15 minutes on heaviest days.  Has tried taking tylenol with minimal improvement.   She has a history of hysteroscopic myomectomy in 2018, blocked left fallopian tube 2014 on HSG which was subsequently removed.  Patient is sexually active. One male partner. She is using nothing for contraception- hoping for pregnancy. Has tried clomid in the past without success.   Denies visual changes, galactorrhea.    Would like a medication to stop bleeding for now, planning to be in [place] with family for the month of April.    Ob Hx:        Gyn Hx: Patient's last menstrual period was 2022.     Last pap was    STI history - gonorrhea >10 years ago  STD testing offered?  Accepted            reports that she has never smoked. She has never used smokeless tobacco.      Today's PHQ-2 Score:   PHQ-2 (  [employer]) 2021   Q1: Little interest or pleasure in doing things 0   Q2: Feeling down, depressed or hopeless 0   PHQ-2 Score 0   PHQ-2 Total Score (12-17 Years)- Positive if 3 or more points; Administer PHQ-A if positive 0     Today's PHQ-9 Score: No flowsheet data found.  Today's CHRISTIN-7 Score: No flowsheet data found.    Problem list and histories reviewed & adjusted, as indicated.  Additional history: as documented.    Patient Active Problem List   Diagnosis     CARDIOVASCULAR SCREENING; LDL GOAL LESS THAN 160     Irregular bleeding     Tubal occlusion     Primary female infertility     Ovarian cyst     Pelvic pain     Cerebral venous sinus thrombosis     Past Surgical History:   Procedure Laterality Date     GYN SURGERY  2014    salpingectomy      OPERATIVE HYSTEROSCOPY WITH MORCELLATOR N/A 09/21/2018    Procedure: OPERATIVE HYSTEROSCOPY WITH MORCELLATOR (MYOSURE);  HYSTEROSCOPY WITH MYOSURE ; RESECTION OF INTRACAVITARY MYOMA . ;  Surgeon: Kole Jesus MD;  Location:  OR      Social History     Tobacco Use     Smoking status: Never Smoker     Smokeless tobacco: Never Used   Substance Use Topics     Alcohol use: No           acetaminophen (TYLENOL) 500 MG tablet, 2 tab at onset of headache, may repeat in 6 hours if needed, max 2-3 doses per day, max 9 days/month to avoid rebound headache.  ELIQUIS ANTICOAGULANT 5 MG tablet, Take 5 mg by mouth 2 times daily  gabapentin (NEURONTIN) 100 MG capsule,   acetaZOLAMIDE (DIAMOX) 250 MG tablet,   ibuprofen (ADVIL/MOTRIN) 600 MG tablet, Take 1 tablet (600 mg) by mouth every 6 hours as needed for pain (mild) (Patient not taking: Reported on 3/14/2022)    No current facility-administered medications on file prior to visit.    Allergies   Allergen Reactions     Chloroquine      Other reaction(s): Itching       ROS:  10 Point review of systems negative other noted above in HPI    OBJECTIVE:     /62   Pulse 85   Wt 67.5 kg (148 lb 14.4 oz)   LMP 02/17/2022   SpO2 99%   BMI 26.38 kg/m    Body mass index is 26.38 kg/m .      Gen: Alert, oriented, appropriately interactive, NAD  Eyes: Eyes grossly normal to inspection, conjunctivae and sclerae normal  CV: No edema  Resp: no audible wheeze, cough, or visible cyanosis.  No visible retractions or increased work of breathing.  Able to speak fully in complete sentences.  Abdomen: soft, non tender, non distended, no masses  External genitalia: no lesions; normal appearing external genitalia, bartholins glands, urethra, skenes glands  Vagina: no masses or lesions or discharge, normally rugated.  Cervix: no masses or lesions or discharge   Bimanual exam:   Nontender pelvic floor muscles  Urethra: nontender   Bladder: nontender and without massess, well supported    Uterus: midline, mid-position, mildly enlarged, possible anterior-fundal fibroid mobile  no masses, non-tender  Adnexa: no masses or tenderness appreciated   No cervical motion tenderness  MSK: normal gait, symmetric movements UE & LE  Lower extremities: non-tender, no edema  Neuro: Cranial nerves grossly intact, mentation intact and speech normal  Psych: mentation appears normal, affect normal/bright, judgement and insight intact, normal speech and appearance well-groomed        In-Clinic Test Results:  No results found for this or any previous visit (from the past 24 hour(s)).     IMPRESSION:   1. Myomatous uterus including a dominant mural-based fibroid  posteriorly and a partially submucosal lesion anteriorly at the  body/fundus. Associated mass effect results in mild compression and  distortion of the endometrial canal.  2. Small probable left ovarian hemorrhagic cyst.     I have personally reviewed the examination and initial interpretation  and I agree with the findings.       ASSESSMENT/PLAN:                                                      Natacha Espinoza is a 36 year old  who presents today consultation regarding AUB, referral from KAYLA Sutton.      ICD-10-CM    1. Menorrhagia with regular cycle  N92.0 US Pelvic Transabdominal and Transvaginal     CBC with platelets     Ferritin     TSH with free T4 reflex     norethindrone (AYGESTIN) 5 MG tablet     ferrous sulfate (SLO-FE) 142 (45 Fe) MG CR tablet     HCG Quantitative Pregnancy, Blood (LBN605)     NEISSERIA GONORRHOEA PCR     CHLAMYDIA TRACHOMATIS PCR     acetaminophen (TYLENOL) 500 MG tablet     CBC with platelets     Ferritin     TSH with free T4 reflex   2. Screening for malignant neoplasm of cervix  Z12.4 Pap screen with HPV - recommended age 30 - 65 years   3. Screen for STD (sexually transmitted disease)  Z11.3 NEISSERIA GONORRHOEA PCR     CHLAMYDIA TRACHOMATIS PCR   4. Dysmenorrhea  N94.6 acetaminophen (TYLENOL) 500 MG tablet        TSH, ferritin, CBC, HCG, Gc/Ct and pelvic ultrasound ordered for further evaluation. Suspect AUB-L with worsening bleeding on anti-coagulation. Patient is not a candidate for CHC given history of VTE. Alternative options reviewed. Patient would like to become pregnant in the future and is looking for a short-term medical solution at this time. May need surgery in the future depending on ultrasound results. For now, will start Aygestin to curb bleeding and complete evaluation. Potential SE of medication reviewed, all questions answered. Follow-up after trip to discuss results and next steps, sooner if needed.     Acetaminophen sent to pharmacy per request for dysmenorrhea. Also recommending iron supplement given bleeding. Rx for this sent as well.    Overdue for pap, therefore collected today.    I personally reviewed her neuro notes and prior OBGYN notes, MRI.      Lena Jade,   [facility] [place]

## 2022-03-14 NOTE — PATIENT INSTRUCTIONS
If you have any questions regarding your visit, Please contact your care team.    [email or web address]: [phone]      [facility] CLINIC HOURS TELEPHONE NUMBER   Lenabrielle Jade DO.    ALISA Lucas -  Joanne -       Suzy RN  Liudmila, RN  NABEEL Kidd     Monday, Thursday  [place]  7am-3pm    Tuesday, Wednesday  [place]  7am-3pm    E/O Friday & 3rd Wednesday  [facility]  [address] [place] [address]  [phone] ask for [facility]    Imaging [phone]       Urgent Care locations:    [facility] Saturday and Sunday   9 am - 5 pm    Monday-Friday   12 pm - 8 pm  Saturday and Sunday   9 am - 5 pm   [phone]     [facility] Labor and Delivery:  [phone]    If you need a medication refill, please contact your pharmacy. Please allow 3 business days for your refill to be completed.  As always, Thank you for trusting us with your healthcare needs!

## 2022-03-15 LAB
C TRACH DNA SPEC QL NAA+PROBE: NEGATIVE
N GONORRHOEA DNA SPEC QL NAA+PROBE: NEGATIVE

## 2022-03-16 ENCOUNTER — TELEPHONE (OUTPATIENT)
Dept: OBGYN | Facility: CLINIC | Age: 37
End: 2022-03-16
Payer: COMMERCIAL

## 2022-03-16 DIAGNOSIS — N92.0 MENORRHAGIA WITH REGULAR CYCLE: ICD-10-CM

## 2022-03-16 LAB
BKR LAB AP GYN ADEQUACY: NORMAL
BKR LAB AP GYN INTERPRETATION: NORMAL
BKR LAB AP HPV REFLEX: NORMAL
BKR LAB AP PREVIOUS ABNORMAL: NORMAL
PATH REPORT.COMMENTS IMP SPEC: NORMAL
PATH REPORT.COMMENTS IMP SPEC: NORMAL
PATH REPORT.RELEVANT HX SPEC: NORMAL

## 2022-03-16 RX ORDER — NORETHINDRONE ACETATE 5 MG
5 TABLET ORAL DAILY
Qty: 90 TABLET | Refills: 0 | Status: CANCELLED | OUTPATIENT
Start: 2022-03-16

## 2022-03-16 NOTE — TELEPHONE ENCOUNTER
Last seen on 3/14/22 for AUB.    RN spoke with pt, who is asking if will have enough medication for her 2 mos trip. 90 tablets were ordered, pt made aware. Pt will check the number of pills in her bottle and will contact pharmacy if needs to obtain more/the full 90 tablets. Pt encouraged to contact clinic w/any further concerns or questions about her aygestin.    Pt verbalized understanding and agreement.    WILMAN Gonzáles RN

## 2022-03-16 NOTE — TELEPHONE ENCOUNTER
[facility] Call Center    Phone Message    May a detailed message be left on voicemail: yes     Reason for Call: Pt is requesting a call back from Dr. Scruggs nurse.  She wouldn't tell me what it's in regards to.  Thanks.    Action Taken: Message routed to:  [facility] p [phone]    Travel Screening: Not Applicable

## 2022-03-16 NOTE — TELEPHONE ENCOUNTER
Patient has questions regarding her prescription for Norethindrone. She is also requesting a refill. Will forward message to  OB/GYN ELISA.    Laurie Lovett CMA

## 2022-03-18 LAB
HUMAN PAPILLOMA VIRUS 16 DNA: NEGATIVE
HUMAN PAPILLOMA VIRUS 18 DNA: NEGATIVE
HUMAN PAPILLOMA VIRUS FINAL DIAGNOSIS: NORMAL
HUMAN PAPILLOMA VIRUS OTHER HR: NEGATIVE

## 2022-03-25 ENCOUNTER — TELEPHONE (OUTPATIENT)
Dept: OBGYN | Facility: CLINIC | Age: 37
End: 2022-03-25
Payer: COMMERCIAL

## 2022-03-25 NOTE — TELEPHONE ENCOUNTER
Pt is calling because she is concerned that she has not had vaginal bleeding since starting the norethindrone (Aygestin) 5mg tablets.  I explained to her that the reason she is taking the Aygestin is to stop her periods since she is going to [place].    Pt is concerned because she is on Eliquis for a blood clot in her brain and is wondering if this is affecting that.  I explained to her that Dr. Jade was aware that pt was on Eliquis for a blood clot and the Aygestin will not affect the blood clot or the effects of the Eliquis at all.  I explained to her that Aygestin is a hormone that affects menstrual cycles/bleeding and wouldn't affect the effects of her blood thinner.    Pt verbalized understanding and agreed to plan.    Suzy Goldsmith RN

## 2022-04-01 ENCOUNTER — DOCUMENTATION ONLY (OUTPATIENT)
Dept: LAB | Facility: CLINIC | Age: 37
End: 2022-04-01
Payer: COMMERCIAL

## 2022-04-01 DIAGNOSIS — D50.0 IRON DEFICIENCY ANEMIA DUE TO CHRONIC BLOOD LOSS: Primary | ICD-10-CM

## 2022-04-01 NOTE — PROGRESS NOTES
Hi, patient has a lab appointment on 04/04/2022 asking for a HGB with no orders. Can you please place an order for appointment.    Thank You Miya DIAZ

## 2022-05-23 ENCOUNTER — TELEPHONE (OUTPATIENT)
Dept: OBGYN | Facility: CLINIC | Age: 37
End: 2022-05-23
Payer: COMMERCIAL

## 2022-05-23 NOTE — TELEPHONE ENCOUNTER
[facility] Call Center    Phone Message    May a detailed message be left on voicemail: yes     Reason for Call: The patient is requesting a call to discuss continued heavy bleeding.  Please advise.  Thank you.     Action Taken: Message routed to:  [facility] p [phone]    Travel Screening: Not Applicable

## 2022-05-23 NOTE — TELEPHONE ENCOUNTER
Pt last seen 3/14/2022 for menorrhagia with regular cycle. Pt was prescribed aygestin 5 MG to stop her bleeding while she went to [facility].    Pt states she had bleeding while taking the aygestin and since stopping it. Pt states she has had continuous bleeding for the past month, changing pad 4-6 times a day. Pt also having cramping that is not relieved with OTC pain medication.    Pt denies fever, urinary concerns, vaginal itching, burning or concerns with BMs    RN spoke with Dr. Jade who recommends US and Hgb and to increase aygestin BID and f/u in clinic-scheduled on 6/6 already.     RN called pt back and pt declines wanting to try aygestin BID, states she will schedule her US sooner and a lab appt, aware of ED bleeding precautions.      Marti Truong RN on 5/23/2022 at 2:37 PM

## 2022-06-03 ENCOUNTER — ANCILLARY PROCEDURE (OUTPATIENT)
Dept: ULTRASOUND IMAGING | Facility: CLINIC | Age: 37
End: 2022-06-03
Attending: OBSTETRICS & GYNECOLOGY
Payer: COMMERCIAL

## 2022-06-03 DIAGNOSIS — N92.0 MENORRHAGIA WITH REGULAR CYCLE: ICD-10-CM

## 2022-06-03 PROCEDURE — 76830 TRANSVAGINAL US NON-OB: CPT | Performed by: RADIOLOGY

## 2022-06-03 PROCEDURE — 76856 US EXAM PELVIC COMPLETE: CPT | Performed by: RADIOLOGY

## 2022-06-06 ENCOUNTER — OFFICE VISIT (OUTPATIENT)
Dept: OBGYN | Facility: CLINIC | Age: 37
End: 2022-06-06
Payer: COMMERCIAL

## 2022-06-06 ENCOUNTER — PREP FOR PROCEDURE (OUTPATIENT)
Dept: OBGYN | Facility: CLINIC | Age: 37
End: 2022-06-06
Payer: COMMERCIAL

## 2022-06-06 VITALS
HEART RATE: 57 BPM | SYSTOLIC BLOOD PRESSURE: 95 MMHG | DIASTOLIC BLOOD PRESSURE: 48 MMHG | BODY MASS INDEX: 27 KG/M2 | WEIGHT: 152.4 LBS

## 2022-06-06 DIAGNOSIS — D25.0 ABNORMAL UTERINE BLEEDING DUE TO SUBMUCOUSAL LEIOMYOMA OF UTERUS: Primary | ICD-10-CM

## 2022-06-06 DIAGNOSIS — D25.9 ABNORMAL UTERINE BLEEDING DUE TO LEIOMYOMA OF UTERUS: Primary | ICD-10-CM

## 2022-06-06 DIAGNOSIS — N93.9 ABNORMAL UTERINE BLEEDING DUE TO SUBMUCOUSAL LEIOMYOMA OF UTERUS: Primary | ICD-10-CM

## 2022-06-06 DIAGNOSIS — N93.9 ABNORMAL UTERINE BLEEDING DUE TO LEIOMYOMA OF UTERUS: Primary | ICD-10-CM

## 2022-06-06 PROCEDURE — 99213 OFFICE O/P EST LOW 20 MIN: CPT | Performed by: OBSTETRICS & GYNECOLOGY

## 2022-06-06 NOTE — PROGRESS NOTES
SUBJECTIVE:       HPI: Natacha Espinoza is a 36 year old  who presents today for presents today for follow-up regarding AUB.    Since last visit, has been doing about the same. She tried the Aygestin without improvement and stopped it two weeks ago. No other changes.    From last visit:    Patient c/o heavy menstrual bleeding since starting Eliquis. Regular menses every 28 days, lasting 4 days.  Periods associated with severe cramping. No lightheadedness. Goes through pads almost every 15 minutes on heaviest days.  Has tried taking tylenol with minimal improvement.   She has a history of hysteroscopic myomectomy in 2018, blocked left fallopian tube 2014 on HSG which was subsequently removed.  Patient is sexually active. One male partner. She is using nothing for contraception- hoping for pregnancy. Has tried clomid in the past without success.   Denies visual changes, galactorrhea.      Ob Hx:        Gyn Hx: No LMP recorded.     Last pap was    STI history - gonorrhea >10 years ago  STD testing offered?  Accepted            reports that she has never smoked. She has never used smokeless tobacco.      Today's PHQ-2 Score:   PHQ-2 (  [employer]) 2021   Q1: Little interest or pleasure in doing things 0   Q2: Feeling down, depressed or hopeless 0   PHQ-2 Score 0   PHQ-2 Total Score (12-17 Years)- Positive if 3 or more points; Administer PHQ-A if positive 0     Today's PHQ-9 Score: No flowsheet data found.  Today's CHRISTIN-7 Score: No flowsheet data found.    Problem list and histories reviewed & adjusted, as indicated.  Additional history: as documented.    Patient Active Problem List   Diagnosis     CARDIOVASCULAR SCREENING; LDL GOAL LESS THAN 160     Irregular bleeding     Tubal occlusion     Primary female infertility     Ovarian cyst     Pelvic pain     Cerebral venous sinus thrombosis     Past Surgical History:   Procedure Laterality Date     GYN SURGERY  2014    salpingectomy     OPERATIVE  HYSTEROSCOPY WITH MORCELLATOR N/A 09/21/2018    Procedure: OPERATIVE HYSTEROSCOPY WITH MORCELLATOR (MYOSURE);  HYSTEROSCOPY WITH MYOSURE ; RESECTION OF INTRACAVITARY MYOMA . ;  Surgeon: Kole Jesus MD;  Location:  OR      Social History     Tobacco Use     Smoking status: Never Smoker     Smokeless tobacco: Never Used   Substance Use Topics     Alcohol use: No           acetaminophen (TYLENOL) 500 MG tablet, 2 tab at onset of headache, may repeat in 6 hours if needed, max 2-3 doses per day, max 9 days/month to avoid rebound headache.  acetaminophen (TYLENOL) 500 MG tablet, Take 1-2 tablets (500-1,000 mg) by mouth every 6 hours as needed for mild pain, fever or pain  ELIQUIS ANTICOAGULANT 5 MG tablet, Take 5 mg by mouth 2 times daily  ferrous sulfate (SLO-FE) 142 (45 Fe) MG CR tablet, Take 1 tablet (142 mg) by mouth daily  gabapentin (NEURONTIN) 100 MG capsule,   ibuprofen (ADVIL/MOTRIN) 600 MG tablet, Take 1 tablet (600 mg) by mouth every 6 hours as needed for pain (mild)  acetaZOLAMIDE (DIAMOX) 250 MG tablet,   norethindrone (AYGESTIN) 5 MG tablet, Take 1 tablet (5 mg) by mouth daily (Patient not taking: Reported on 6/6/2022)    No current facility-administered medications on file prior to visit.    Allergies   Allergen Reactions     Chloroquine      Other reaction(s): Itching       ROS:  10 Point review of systems negative other noted above in HPI    OBJECTIVE:     BP 95/48   Pulse 57   Wt 69.1 kg (152 lb 6.4 oz)   BMI 27.00 kg/m    Body mass index is 27 kg/m .      Gen: Alert, oriented, appropriately interactive, NAD  Resp: no audible wheeze, cough, or visible cyanosis.  No visible retractions or increased work of breathing.  Able to speak fully in complete sentences.  Pelvic - deferred recent exam  Psych: mentation appears normal, affect normal/bright, judgement and insight intact, normal speech and appearance well-groomed        In-Clinic Test Results:  No results found for this or any previous  visit (from the past 24 hour(s)).     IMPRESSION:   1. Myomatous uterus including a dominant mural-based fibroid  posteriorly and a partially submucosal lesion anteriorly at the  body/fundus. Associated mass effect results in mild compression and  distortion of the endometrial canal.  2. Small probable left ovarian hemorrhagic cyst.     I have personally reviewed the examination and initial interpretation  and I agree with the findings.     Results for orders placed or performed in visit on 22   US Pelvic Transabdominal and Transvaginal    Narrative    EXAMINATION: US PELVIC TRANSABDOMINAL AND TRANSVAGINAL 6/3/2022 11:19  AM      CLINICAL HISTORY: Persistent vaginal bleeding. Myomectomy in 2018.  pelvic pain, history fibroids; Menorrhagia with regular cycle    COMPARISON: Pelvic MRI 2018    PROCEDURE COMMENT: Both transabdominal and transvaginal imaging  performed of the pelvis with color Doppler.    FINDINGS:  The uterus measures  6.0 cm x 10.6 cm x 6.4 cm. MRI demonstrates  numerous uterine fibroids that are not as delineated by ultrasound.  The uterus has heterogeneous echotexture. There is a large centrally  located submucosal fibroid in the fundal region with mass effect on  the endometrium. It measures 4.1 x 4 x 4.1 cm on ultrasound. Nabothian  cysts.    The endometrial stripe measures 4 mm.     The right ovary measures 2.6 cm x 3.1 cm x 1.4 cm. The left ovary  measures 3.2 cm x 3.3 cm x 1.4 cm. The ovaries have normal morphology.      There is no simple free fluid in the pelvis.  No adnexal mass.      Impression    IMPRESSION:  1. Heterogeneous uterine echotexture. One defined centrally located  submucosal fibroid measuring 4.1 cm.    RADHA REYES MD         SYSTEM ID:  [id number]          ASSESSMENT/PLAN:                                                      Natacha Espinoza is a 36 year old  who presents today follow-up AUB.      ICD-10-CM    1. Abnormal uterine bleeding due to leiomyoma of uterus   N93.9     D25.9        AUB-L with worsening bleeding on anti-coagulation. Patient is not a candidate for CHC given history of VTE. Alternative options reviewed, including medical and surgical. After discussing RBA, patient would like to go forward with hysteroscopy with myosure removal of fibroid. Details of the procedure were discussed with the patient.  Risks include, but are not limited to, bleeding, infection, and injury to surrounding organs such as the bowel, urinary system, nerves, and blood vessels.  Injury may result in repair at the time of the surgery or in a separate procedure.  All questions answered, and accepting these risks, the patient elects to proceed with the procedure.   Patient currently on Eliquis of note for history VTE. Recommend discussing with Primary care provider alternative when TTC given limited data on safety in pregnancy.    Lena Jade DO  [facility] [place]

## 2022-06-06 NOTE — PATIENT INSTRUCTIONS
If you have any questions regarding your visit, Please contact your care team.    [facility]: [phone]      Women s Health CLINIC HOURS TELEPHONE NUMBER   Lena Jade DO.    ALISA Lucas -Surgery Scheduler  Joanne - Surgery Scheduler    NABEEL Almonte, RN  NABEEL Kidd     Monday, Thursday  [place]  7am-3pm    Tuesday, Wednesday  [place]  7am-3pm    E/O Friday &   [place]    Typical Surgery Days: Thursday or Friday   [facility]  [address] [place] [address] [phone] Phone  [phone] Fax    [address] [address] [phone] Phone    Imaging [phone] Phone    [facility] Labor and Delivery:  [phone] Phone     **Surgeries** Our Surgery Schedulers will contact you to schedule. If you do not receive a call within 3 business days, please call [phone].    Urgent Care locations:  [facility] Saturday and    9 am - 5 pm    Monday-Friday   12 pm - 8 pm  Saturday and    9 am - 5 pm   [phone]       If you need a medication refill, please contact your pharmacy. Please allow 3 business days for your refill to be completed.  As always, Thank you for trusting us with your healthcare needs!

## 2022-06-09 ENCOUNTER — TELEPHONE (OUTPATIENT)
Dept: OBGYN | Facility: CLINIC | Age: 37
End: 2022-06-09

## 2022-06-09 DIAGNOSIS — Z11.52 ENCOUNTER FOR PREPROCEDURE SCREENING LABORATORY TESTING FOR COVID-19: Primary | ICD-10-CM

## 2022-06-09 DIAGNOSIS — Z01.812 ENCOUNTER FOR PREPROCEDURE SCREENING LABORATORY TESTING FOR COVID-19: Primary | ICD-10-CM

## 2022-06-09 NOTE — TELEPHONE ENCOUNTER
I spoke with the patient and she does not want her surgery until September.  Checking with Dr. Jade to make sure it is okay to wait until then.  If so, we will postpone the Case Request until August.    \Joanne Herrera  /Surgery Scheduler

## 2022-06-09 NOTE — TELEPHONE ENCOUNTER
Associated Diagnoses    Abnormal uterine bleeding due to submucousal leiomyoma of uterus [N93.9, D25.0]  - Primary             Procedures    Procedure Laterality Anesthesia Region   HYSTEROSCOPY, WITH DILATION AND CURETTAGE OF UTERUS USING MORCELLATOR N/A Monitor Anesthesia Care       Requested date:    Location:  OR   Patient class: Outpatient      Pre-op diagnoses:  Abnormal uterine bleeding due to submucousal leiomyoma of uterus       Scheduling Instructions    Additional Instructions for the Case   Surgical Assistant: No   Multi Surgeon Case No   H&P:  Pre-op options: PCP   Post-op:  2 weeks   Sterilization consent:  Not applicable to procedure being performed.   Vendor: No   Surgical time needed: Average   ERAS patient: No     SURGERY SCHEDULING AND PRECERTIFICATION    Medical Record Number: [id number]  Natacha Espinoza  YOB: 1985   Phone: [phone] (home)   Primary Provider: [facility] [place]    Reason for Admit:  ICD-10 CODE:    Abnormal uterine bleeding due to submucousal leiomyoma of uterus [N93.9, D25.0]  - Primary      Surgeon: Lena Jade DO  Surgical Procedure: HYSTEROSCOPY, WITH DILATION AND CURETTAGE OF UTERUS USING MORCELLATOR    Date of Surgery 10/21 Time of Surgery 8:00 a.m.   Patient needs to cancel surgery because she is leaving for [facility] to care for her mother.  She does not know when she will be returning and will call us when she returns to get her surgery scheduled.   The ASC was notified and appts were cancelled.   Surgery to be performed at:  [facility]   Status: Outpatient  Type of Anesthesia Anticipated: MAC    Sterilization consent:  Not applicable to procedure being performed.    Pre-Op: On 10/10 Dayanara Morgan 11:20 a.m.  COVID testing:  She would like it done at a lab versus a home test.  10/17 [place] at 3:30 p.m.  Post-Op:  2 weeks on 11/3 1:00 Dr. Jade    Pre-certification routed to [facility]  Counselors:  Auto routes via Case Request    Surgery packet mailed to patient's home address: Yes  Patient instructed NPO 8 hours prior to surgery, arrive according to the time the nurse gives patient when called prior to surgery, must have a .  Patient understood and agrees to the plan.      Requestor:  Halie Herrera     Location:  [place] [phone]

## 2022-07-15 DIAGNOSIS — N92.0 MENORRHAGIA WITH REGULAR CYCLE: ICD-10-CM

## 2022-07-17 RX ORDER — NORETHINDRONE ACETATE 5 MG
5 TABLET ORAL DAILY
Qty: 90 TABLET | Refills: 0 | Status: SHIPPED | OUTPATIENT
Start: 2022-07-17 | End: 2022-09-08

## 2022-08-02 ENCOUNTER — HOSPITAL ENCOUNTER (OUTPATIENT)
Facility: AMBULATORY SURGERY CENTER | Age: 37
End: 2022-08-02
Attending: OBSTETRICS & GYNECOLOGY
Payer: COMMERCIAL

## 2022-08-02 DIAGNOSIS — N93.9 ABNORMAL UTERINE BLEEDING DUE TO SUBMUCOUSAL LEIOMYOMA OF UTERUS: ICD-10-CM

## 2022-08-02 DIAGNOSIS — D25.0 ABNORMAL UTERINE BLEEDING DUE TO SUBMUCOUSAL LEIOMYOMA OF UTERUS: ICD-10-CM

## 2022-08-02 NOTE — TELEPHONE ENCOUNTER
[facility]    Phone Message    May a detailed message be left on voicemail: yes     Reason for Call: Natacha would like to schedule her surgery, please give her a all back.     Action Taken: Message routed to:  [facility] p [id number]    Travel Screening: Not Applicable

## 2022-08-08 NOTE — TELEPHONE ENCOUNTER
Patient needs a note stating her appt dates and surgery date.  Dr. Jade said she could have up 23- days postop off if needed.    Joanne Herrera  /Surgery Scheduler

## 2022-08-18 ENCOUNTER — TELEPHONE (OUTPATIENT)
Dept: OBGYN | Facility: CLINIC | Age: 37
End: 2022-08-18

## 2022-08-18 NOTE — TELEPHONE ENCOUNTER
[facility]    Phone Message    May a detailed message be left on voicemail: yes     Reason for Call: Form or Letter   Type or form/letter needing completion: Pt is requesting a letter for her employer to let them know she is having surgery.  Pt is requesting that we call her at 3 or later today to discuss the details.  Thanks.

## 2022-08-18 NOTE — TELEPHONE ENCOUNTER
Attempted to contact patient. Phone rang and rang then disconnected.    See previous telephone encounter.        August 8, 2022    Halie Herrera     Note  Patient needs a note stating her appt dates and surgery date.  Dr. Jade said she could have up 23- days postop off if needed.     Joanne Herrera  /Surgery Scheduler           There is a letter created in Epic. Can discuss further details with patient.    Will attempt to call later.    Abelardo Guidry, CMA

## 2022-08-19 NOTE — TELEPHONE ENCOUNTER
Unable to reach patient via phone. Left message to call back at [phone]    A letter has been written for patient, please ask her if she need the letter sent anywhere and we can send it.    Karly Owusu CMA 8/19/2022 12:51 PM

## 2022-08-22 NOTE — TELEPHONE ENCOUNTER
Letter printer and will mail from [facility].    Abelardo Guidry, [facility]

## 2022-08-22 NOTE — TELEPHONE ENCOUNTER
Called and spoke with patient.  Patient states she would like the letter mail to her.    Can someone at a different location  print off letter and mail it to patients address on file.    Karly Owusu CMA 8/22/2022 9:00 AM

## 2022-08-31 ENCOUNTER — TELEPHONE (OUTPATIENT)
Dept: OBGYN | Facility: CLINIC | Age: 37
End: 2022-08-31

## 2022-08-31 NOTE — TELEPHONE ENCOUNTER
[facility]    Phone Message    May a detailed message be left on voicemail: yes     Reason for Call: Jnen ochoa RN from [facility] is requesting a call back to discuss some questions she has about this Pt stopping Eloquis prior to surgery.  Thanks.    Action Taken: Message routed to:  [facility] p [id number]    Travel Screening: Not Applicable

## 2022-08-31 NOTE — TELEPHONE ENCOUNTER
Pt is scheduled for a hysteroscopy D&C with Dr. Jade on 10/21/22.    Spoke with NABEEL Barajas, from the [facility] antico[facility].  She was asking how many days the pt should stop their eloquis prior to their procedure listed above.    Spoke with Dr. Jade who states pt needs discuss this with the provider at her pre-op.  The anticoagulation nurse states the FP providers typically call them for direction on when to stop it.  The nurse was just unsure what the actual procedure was as that is not on their list of procedures.  I explained what the procedure entails.    Suzy Goldsmith RN

## 2022-09-08 ENCOUNTER — TELEPHONE (OUTPATIENT)
Dept: OBGYN | Facility: CLINIC | Age: 37
End: 2022-09-08

## 2022-09-08 DIAGNOSIS — N92.0 MENORRHAGIA WITH REGULAR CYCLE: ICD-10-CM

## 2022-09-08 RX ORDER — NORETHINDRONE ACETATE 5 MG
5 TABLET ORAL DAILY
Qty: 90 TABLET | Refills: 0 | Status: SHIPPED | OUTPATIENT
Start: 2022-09-08 | End: 2023-11-06

## 2022-09-08 RX ORDER — NORETHINDRONE ACETATE 5 MG
5 TABLET ORAL DAILY
Qty: 90 TABLET | Refills: 0 | Status: CANCELLED | OUTPATIENT
Start: 2022-09-08

## 2022-09-08 NOTE — TELEPHONE ENCOUNTER
LVM for patient to call me back.  We are unable to schedule yet for January.     Joanne Herrera  /Surgery Scheduler

## 2022-09-08 NOTE — TELEPHONE ENCOUNTER
I spoke to the patient and she does not know how long she will be in [place] caring for her mother so she will call us when she returns to get her surgery scheduled.      Surgery and appointments have been cancelled.    Joanne Herrera  /Surgery Scheduler

## 2022-09-08 NOTE — TELEPHONE ENCOUNTER
Patient needs to cancel surgery because she is leaving for [facility] to care for her mother.  She does not know when she will be returning and will call us when she returns to get her surgery scheduled.     Joanne Herrera  /Surgery Scheduler

## 2022-09-08 NOTE — TELEPHONE ENCOUNTER
Left message for patient that a prescription was sent to her pharmacy from Dr. Jade.    Abelardo Guidry, [facility]

## 2022-09-08 NOTE — TELEPHONE ENCOUNTER
Received a phone call from patient and she has to travel to [place] soon until January. She would like to take a refill of Aygestin with her. She was scheduled for surgery in October but is needing to reschedule. Please advise. Patient states she dropped the pills she has on the floor.  Soledad Roque CMA

## 2022-09-09 NOTE — TELEPHONE ENCOUNTER
Unable to reach patient via phone. Left message to call back at [phone]    Karly Owusu CMA 9/9/2022 9:03 AM

## 2022-09-13 NOTE — TELEPHONE ENCOUNTER
"Unable to reach patient via phone. Left message to call back at [phone] if she has questions otherwise a \"prescription\" was sent in by Dr. Jade.    Closing encounter as pt has not answered out phone calls.    Karly Owusu CMA 9/13/2022 10:54 AM    "

## 2022-11-16 ENCOUNTER — TELEPHONE (OUTPATIENT)
Dept: OBGYN | Facility: CLINIC | Age: 37
End: 2022-11-16

## 2022-11-16 NOTE — TELEPHONE ENCOUNTER
[facility] Call [facility]    Phone Message    May a detailed message be left on voicemail: yes     Reason for Call: The patient is requesting a call to schedule a fibroid procedure with Dr. Jade.  Please advise.  Thank you.     Action Taken: Message routed to:  [facility] p [phone]    Travel Screening: Not Applicable

## 2022-11-22 ENCOUNTER — PREP FOR PROCEDURE (OUTPATIENT)
Dept: OBGYN | Facility: CLINIC | Age: 37
End: 2022-11-22

## 2022-11-22 DIAGNOSIS — N93.9 ABNORMAL UTERINE BLEEDING (AUB): Primary | ICD-10-CM

## 2022-11-22 NOTE — TELEPHONE ENCOUNTER
Associated Diagnoses    Abnormal uterine bleeding (AUB) [N93.9]  - Primary         Source Order Set    Order Set Name Order ID    [id number]     Case Request: Case Info    Panel 1    Providers    Provider Role Service   Lena Jade DO Primary      Procedures    Procedure Laterality Anesthesia Region   HYSTEROSCOPY, WITH DILATION AND CURETTAGE OF UTERUS USING MORCELLATOR N/A MAC                   Requested date:    Location:  OR   Patient class:       Pre-op diagnoses:  Abnormal uterine bleeding (AUB)     Scheduling Instructions    Additional Instructions for the Case   Surgical Assistant: No   Multi Surgeon Case No   H&P:  Pre-op options: PCP   Post-op:  2 weeks   Sterilization consent:  Not applicable to procedure being performed.   Vendor: No   Surgical time needed: Average   ERAS patient: No   SURGERY SCHEDULING AND PRECERTIFICATION    Medical Record Number: [id number]  Natacha Espinoza  YOB: 1985   Phone: [phone] (home)   [facility]    Reason for Admit:  ICD-10 CODE:  N93.9]    Surgeon: Lena Jade DO  Surgical Procedure: HYSTEROSCOPY, WITH DILATION AND CURETTAGE OF UTERUS USING MORCELLATOR    Date of Surgery 01/06 Time of Surgery 12:00pm  Surgery to be performed at:  [facility] [place]   Status: Outpatient  Type of Anesthesia Anticipated: MAC    Sterilization consent:  Not applicable to procedure being performed.    Pre-Op: On 01/03 with Dayanara Escoto at [place]  COVID testing:  Patient is going to do the at-home rapid antigen test 1-2 days before the procedure and bring the results with on the day of the procedure.   Post-Op:  2 weeks on 01/26 with Dr Jade at [place]    Pre-certification routed to [employer]:  Auto routes via Case Request    Surgery packet mailed to patient's home address: Yes  Patient instructed NPO 12 hours prior to surgery, arrive according to the time the nurse  gives patient when called prior to surgery, must have a .  Patient understood and agrees to the plan.      Requestor:  Omayra Betancur     Location:  [place] [phone]

## 2022-12-19 ENCOUNTER — TELEPHONE (OUTPATIENT)
Dept: OBGYN | Facility: CLINIC | Age: 37
End: 2022-12-19

## 2022-12-19 NOTE — TELEPHONE ENCOUNTER
Left message for patient to call back. I don't see any encounters in regards to forms that she may have sent/dropped off. Please verify if she has sent these or her employer has so we can fill them out    Venita Mcnamara MA

## 2022-12-19 NOTE — TELEPHONE ENCOUNTER
[facility] Call [facility]    Phone Message    May a detailed message be left on voicemail: yes     Reason for Call: The patient is requesting a call to discuss if the  paper work for her surgery has been sent to her employer.  Please advise.  Thank you.     Action Taken: Message routed to:  [facility] p [phone]    Travel Screening: Not Applicable

## 2022-12-19 NOTE — LETTER
[facility] [place]  [address]  [facility] [address]  [phone]          December 21, 2022    Natacha Olga                                                                                                                     [address] [address]            To Whom It May Concern,    Natacha is under the supervision of my care. Please excuse her from work from 1/6 - 1/13.        Sincerely,         Lena Jade DO

## 2022-12-20 NOTE — TELEPHONE ENCOUNTER
Pt is requesting a work release note to excuse her after surgery. Pt scheduled for hysteroscopy, D&C with Yasmany on 1/6/22. Pt requesting letter excuse her from work for 1 week starting 1/6.    Pt would like letter faxed to employer, fax # [phone].    Ángela Frank RN on 12/20/2022 at 11:09 AM

## 2022-12-21 NOTE — TELEPHONE ENCOUNTER
RN may compose letter on my behalf but please let patient know that usually only 1-3 days are needed after this type of surgery.     Thank you,   Lena Jade, DO

## 2022-12-23 NOTE — TELEPHONE ENCOUNTER
Letter faxed to number pt provided. Notified pt the letter has been faxed and written as requested, and of provider typical recommendation of 1-3 days. Pt states her job is very physically demanding and feels the additional time is needed. Pt satisfied with letter as written.    Ángela Frank RN on 12/23/2022 at 10:42 AM

## 2023-01-02 ENCOUNTER — TELEPHONE (OUTPATIENT)
Dept: OBGYN | Facility: CLINIC | Age: 38
End: 2023-01-02

## 2023-01-02 NOTE — TELEPHONE ENCOUNTER
Spoke with [facility] and they were able to move the surgery to 12:30pm.  Updated patient with this information and updated surgery calendar for Dr Jade.

## 2023-01-02 NOTE — TELEPHONE ENCOUNTER
Date of Surgery 01/06 Time of Surgery 12:00pm  Surgery to be performed at:  [facility]   Status: Outpatient  Type of Anesthesia Anticipated: MAC       Pt calling stating she was told to arrive by 7AM for her surgery, pt states she is unable to get there until 9AM.    RN routing to [facility] to be sure on timing of arrival for surgery. Pt would like a call back to confirm timing.    Marti Truong RN on 1/2/2023 at 2:59 PM

## 2023-01-02 NOTE — TELEPHONE ENCOUNTER
[facility] Call [facility]    Phone Message    May a detailed message be left on voicemail: yes     Reason for Call: Other:      Pt is requesting a call back to discuss their upcoming surgery with Dr. Jade     Action Taken: Message routed to:  [facility] p [phone]    Travel Screening: Not Applicable

## 2023-01-03 ENCOUNTER — OFFICE VISIT (OUTPATIENT)
Dept: FAMILY MEDICINE | Facility: CLINIC | Age: 38
End: 2023-01-03
Payer: COMMERCIAL

## 2023-01-03 ENCOUNTER — ANESTHESIA EVENT (OUTPATIENT)
Dept: SURGERY | Facility: AMBULATORY SURGERY CENTER | Age: 38
End: 2023-01-03
Payer: COMMERCIAL

## 2023-01-03 VITALS
WEIGHT: 156.2 LBS | RESPIRATION RATE: 19 BRPM | HEIGHT: 64 IN | DIASTOLIC BLOOD PRESSURE: 74 MMHG | BODY MASS INDEX: 26.67 KG/M2 | OXYGEN SATURATION: 100 % | TEMPERATURE: 97.9 F | SYSTOLIC BLOOD PRESSURE: 112 MMHG | HEART RATE: 72 BPM

## 2023-01-03 DIAGNOSIS — D25.0 ABNORMAL UTERINE BLEEDING DUE TO SUBMUCOUSAL LEIOMYOMA OF UTERUS: ICD-10-CM

## 2023-01-03 DIAGNOSIS — N93.9 ABNORMAL UTERINE BLEEDING DUE TO SUBMUCOUSAL LEIOMYOMA OF UTERUS: ICD-10-CM

## 2023-01-03 DIAGNOSIS — Z01.818 PREOP GENERAL PHYSICAL EXAM: Primary | ICD-10-CM

## 2023-01-03 DIAGNOSIS — G08 CEREBRAL VENOUS SINUS THROMBOSIS: ICD-10-CM

## 2023-01-03 LAB
ERYTHROCYTE [DISTWIDTH] IN BLOOD BY AUTOMATED COUNT: 17.1 % (ref 10–15)
HCT VFR BLD AUTO: 37.6 % (ref 35–47)
HGB BLD-MCNC: 11.9 G/DL (ref 11.7–15.7)
MCH RBC QN AUTO: 26.3 PG (ref 26.5–33)
MCHC RBC AUTO-ENTMCNC: 31.6 G/DL (ref 31.5–36.5)
MCV RBC AUTO: 83 FL (ref 78–100)
PLATELET # BLD AUTO: 405 10E3/UL (ref 150–450)
RBC # BLD AUTO: 4.53 10E6/UL (ref 3.8–5.2)
WBC # BLD AUTO: 5.2 10E3/UL (ref 4–11)

## 2023-01-03 PROCEDURE — 85027 COMPLETE CBC AUTOMATED: CPT | Performed by: PHYSICIAN ASSISTANT

## 2023-01-03 PROCEDURE — 36415 COLL VENOUS BLD VENIPUNCTURE: CPT | Performed by: PHYSICIAN ASSISTANT

## 2023-01-03 PROCEDURE — 99214 OFFICE O/P EST MOD 30 MIN: CPT | Performed by: PHYSICIAN ASSISTANT

## 2023-01-03 ASSESSMENT — PAIN SCALES - GENERAL: PAINLEVEL: NO PAIN (0)

## 2023-01-03 NOTE — PROGRESS NOTES
[address] [address]  Phone: [phone]  Primary Provider: [facility]  Pre-op Performing Provider: VANESSA ROSS      PREOPERATIVE EVALUATION:  Today's date: 1/3/2023    Natacha Espinoza is a 37 year old female who presents for a preoperative evaluation.    Surgical Information:  Surgery/Procedure: HYSTEROSCOPY, WITH DILATION AND CURETTAGE OF UTERUS USING MORCELLATOR  Surgery Location: [facility]  Surgeon: Lena Jade DO  Surgery Date: 01/06/2023  Time of Surgery: 12:45 PM  Where patient plans to recover: At home with family  Fax number for surgical facility: Note does not need to be faxed, will be available electronically in Epic.    Type of Anesthesia Anticipated: General    Assessment & Plan     The proposed surgical procedure is considered LOW risk.    Problem List Items Addressed This Visit        Circulatory    Cerebral venous sinus thrombosis       Urinary    Abnormal uterine bleeding due to submucousal leiomyoma of uterus    Relevant Orders    CBC with platelets (Completed)   Other Visit Diagnoses     Preop general physical exam    -  Primary          Risks and Recommendations:  The patient has the following additional risks and recommendations for perioperative complications:  Anemia/Bleeding/Clotting:    - CVS thrombosis- thrombosis clinic approves to stop Eliquis before the procedure  Last neurology appointment 2/17/22-  MR venogram is normal, no new clot. Neurology supports to stop Eliquis if thrombosis clinic approves.     Medication Instructions:   - apixaban (Eliquis), edoxaban (Savaysa), rivaroxaban (Xarelto): Bleeding risk is moderate or high for this procedure AND CrCl  (>=) 50 mL/min. HOLD 2 days before surgery. -may restart 1 day after the surgery  Stopped NSAID ( Ibuprofen ) 1 week ago    RECOMMENDATION:  APPROVAL GIVEN to proceed with proposed  procedure, without further diagnostic evaluation.      20 minutes spent on the date of the encounter doing chart review, history and exam, documentation and further activities per the note      Subjective     HPI related to upcoming procedure: AUB due to fibroid. Patient is having Hysteroscopy       Preop Questions 1/3/2023   1. Have you ever had a heart attack or stroke? No   2. Have you ever had surgery on your heart or blood vessels, such as a stent placement, a coronary artery bypass, or surgery on an artery in your head, neck, heart, or legs? No   3. Do you have chest pain with activity? No   4. Do you have a history of  heart failure? No   5. Do you currently have a cold, bronchitis or symptoms of other infection? No   6. Do you have a cough, shortness of breath, or wheezing? No   7. Do you or anyone in your family have previous history of blood clots? YES - CVS thrombus of unknown origin. Patient is on eliquis   8. Do you or does anyone in your family have a serious bleeding problem such as prolonged bleeding following surgeries or cuts? No   9. Have you ever had problems with anemia or been told to take iron pills? No   10. Have you had any abnormal blood loss such as black, tarry or bloody stools, or abnormal vaginal bleeding? No   11. Have you ever had a blood transfusion? No   12. Are you willing to have a blood transfusion if it is medically needed before, during, or after your surgery? YES   13. Have you or any of your relatives ever had problems with anesthesia? No   14. Do you have sleep apnea, excessive snoring or daytime drowsiness? No   15. Do you have any artifical heart valves or other implanted medical devices like a pacemaker, defibrillator, or continuous glucose monitor? No   16. Do you have artificial joints? No   17. Are you allergic to latex? No   18. Is there any chance that you may be pregnant? no     Health Care Directive:  Patient does not have a Health Care Directive or Living Will:  Discussed advance care planning with patient; however, patient declined at this time.  FULL CODE  Preoperative Review of :   reviewed - no record of controlled substances prescribed.      Status of Chronic Conditions:  See problem list for active medical problems.  Problems all longstanding and stable, except as noted/documented.  See ROS for pertinent symptoms related to these conditions.      Review of Systems  Constitutional, neuro, ENT, endocrine, pulmonary, cardiac, gastrointestinal, genitourinary, musculoskeletal, integument and psychiatric systems are negative, except as otherwise noted.    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 ;  Surgeon: Kole Jesus MD;  Location:  OR     Current Outpatient Medications   Medication Sig Dispense Refill     acetaminophen (TYLENOL) 500 MG tablet 2 tab at onset of headache, may repeat in 6 hours if needed, max 2-3 doses per day, max 9 days/month to avoid rebound headache.       acetaminophen (TYLENOL) 500 MG tablet Take  "1-2 tablets (500-1,000 mg) by mouth every 6 hours as needed for mild pain, fever or pain 60 tablet 1     acetaZOLAMIDE (DIAMOX) 250 MG tablet  (Patient not taking: Reported on 6/6/2022)       ELIQUIS ANTICOAGULANT 5 MG tablet Take 5 mg by mouth 2 times daily (Patient not taking: Reported on 1/3/2023)       ferrous sulfate (SLO-FE) 142 (45 Fe) MG CR tablet Take 1 tablet (142 mg) by mouth daily (Patient not taking: Reported on 1/3/2023) 90 tablet 3     gabapentin (NEURONTIN) 100 MG capsule  (Patient not taking: Reported on 1/3/2023)       ibuprofen (ADVIL/MOTRIN) 600 MG tablet Take 1 tablet (600 mg) by mouth every 6 hours as needed for pain (mild) 40 tablet 0     norethindrone (AYGESTIN) 5 MG tablet Take 1 tablet (5 mg) by mouth daily (Patient not taking: Reported on 1/3/2023) 90 tablet 0       Allergies   Allergen Reactions     Chloroquine      Other reaction(s): Itching        Social History     Tobacco Use     Smoking status: Never     Smokeless tobacco: Never   Substance Use Topics     Alcohol use: No     History reviewed. No pertinent family history.  History   Drug Use No         Objective     /74 (BP Location: Left arm, Patient Position: Sitting, Cuff Size: Adult Large)   Pulse 72   Temp 97.9  F (36.6  C) (Oral)   Resp 19   Ht 1.614 m (5' 3.54\")   Wt 70.9 kg (156 lb 3.2 oz)   LMP 12/27/2022 (Approximate)   SpO2 100%   BMI 27.20 kg/m      Physical Exam    GENERAL APPEARANCE: healthy, alert and no distress     EYES: EOMI, PERRL     HENT: ear canals and TM's normal and nose and mouth without ulcers or lesions     NECK: no adenopathy, no asymmetry, masses, or scars and thyroid normal to palpation     RESP: lungs clear to auscultation - no rales, rhonchi or wheezes     CV: regular rates and rhythm, normal S1 S2, no S3 or S4 and no murmur, click or rub     ABDOMEN:  soft, nontender, no HSM or masses and bowel sounds normal     MS: extremities normal- no gross deformities noted, no evidence of " inflammation in joints, FROM in all extremities.     SKIN: no suspicious lesions or rashes     NEURO: Normal strength and tone, sensory exam grossly normal, mentation intact and speech normal     PSYCH: mentation appears normal. and affect normal/bright     LYMPHATICS: No cervical adenopathy    Recent Labs   Lab Test 05/20/22     HGB 11.2           Diagnostics:  Results for orders placed or performed in visit on 01/03/23 (from the past 24 hour(s))   CBC with platelets   Result Value Ref Range    WBC Count 5.2 4.0 - 11.0 10e3/uL    RBC Count 4.53 3.80 - 5.20 10e6/uL    Hemoglobin 11.9 11.7 - 15.7 g/dL    Hematocrit 37.6 35.0 - 47.0 %    MCV 83 78 - 100 fL    MCH 26.3 (L) 26.5 - 33.0 pg    MCHC 31.6 31.5 - 36.5 g/dL    RDW 17.1 (H) 10.0 - 15.0 %    Platelet Count 405 150 - 450 10e3/uL        No EKG required for low risk surgery (cataract, skin procedure, breast biopsy, etc).  No EKG required, no history of coronary heart disease, significant arrhythmia, peripheral arterial disease or other structural heart disease.    Revised Cardiac Risk Index (RCRI):  The patient has the following serious cardiovascular risks for perioperative complications:   - No serious cardiac risks = 0 points     RCRI Interpretation: 0 points: Class I (very low risk - 0.4% complication rate)           Signed Electronically by: Dayanara Escoto PA-C  Reviewed and agree with assessment and plan above. Curtis Rodas MD    Copy of this evaluation report is provided to requesting physician.

## 2023-01-03 NOTE — PATIENT INSTRUCTIONS
For informational purposes only. Not to replace the advice of your health care provider. Copyright   2003,  [facility]. All rights reserved. Clinically reviewed by Mayra Rodriguez MD. [employer] [id number] - REV .  Preparing for Your Surgery  Getting started  A nurse will call you to review your health history and instructions. They will give you an arrival time based on your scheduled surgery time. Please be ready to share:    Your doctor's clinic name and phone number    Your medical, surgical, and anesthesia history    A list of allergies and sensitivities    A list of medicines, including herbal treatments and over-the-counter drugs    Whether the patient has a legal guardian (ask how to send us the papers in advance)  Please tell us if you're pregnant--or if there's any chance you might be pregnant. Some surgeries may injure a fetus (unborn baby), so they require a pregnancy test. Surgeries that are safe for a fetus don't always need a test, and you can choose whether to have one.   If you have a child who's having surgery, please ask for a copy of Preparing for Your Child's Surgery.    Preparing for surgery    Within 10 to 30 days of surgery: Have a pre-op exam (sometimes called an H&P, or History and Physical). This can be done at a clinic or pre-operative center.  ? If you're having a , you may not need this exam. Talk to your care team.    At your pre-op exam, talk to your care team about all medicines you take. If you need to stop any medicines before surgery, ask when to start taking them again.  ? We do this for your safety. Many medicines can make you bleed too much during surgery. Some change how well surgery (anesthesia) drugs work.    Call your insurance company to let them know you're having surgery. (If you don't have insurance, call [phone].)    Call your clinic if there's any change in your health. This includes signs of a cold or flu (sore throat, runny nose,  cough, rash, fever). It also includes a scrape or scratch near the surgery site.    If you have questions on the day of surgery, call your hospital or surgery center.  Eating and drinking guidelines  For your safety: Unless your surgeon tells you otherwise, follow the guidelines below.    Eat and drink as usual until 8 hours before you arrive for surgery. After that, no food or milk.    Drink clear liquids until 2 hours before you arrive. These are liquids you can see through, like water, Gatorade, and Propel Water. They also include plain black coffee and tea (no cream or milk), candy, and breath mints. You can spit out gum when you arrive.    If you drink alcohol: Stop drinking it the night before surgery.    If your care team tells you to take medicine on the morning of surgery, it's okay to take it with a sip of water.  Preventing infection    Shower or bathe the night before and morning of your surgery. Follow the instructions your clinic gave you. (If no instructions, use regular soap.)    Don't shave or clip hair near your surgery site. We'll remove the hair if needed.    Don't smoke or vape the morning of surgery. You may chew nicotine gum up to 2 hours before surgery. A nicotine patch is okay.  ? Note: Some surgeries require you to completely quit smoking and nicotine. Check with your surgeon.    Your care team will make every effort to keep you safe from infection. We will:  ? Clean our hands often with soap and water (or an alcohol-based hand rub).  ? Clean the skin at your surgery site with a special soap that kills germs.  ? Give you a special gown to keep you warm. (Cold raises the risk of infection.)  ? Wear special hair covers, masks, gowns and gloves during surgery.  ? Give antibiotic medicine, if prescribed. Not all surgeries need antibiotics.  What to bring on the day of surgery    Photo ID and insurance card    Copy of your health care directive, if you have one    Glasses and hearing aids (bring  cases)  ? You can't wear contacts during surgery    Inhaler and eye drops, if you use them (tell us about these when you arrive)    CPAP machine or breathing device, if you use them    A few personal items, if spending the night    If you have . . .  ? A pacemaker, ICD (cardiac defibrillator) or other implant: Bring the ID card.  ? An implanted stimulator: Bring the remote control.  ? A legal guardian: Bring a copy of the certified (court-stamped) guardianship papers.  Please remove any jewelry, including body piercings. Leave jewelry and other valuables at home.  If you're going home the day of surgery    You must have a responsible adult drive you home. They should stay with you overnight as well.    If you don't have someone to stay with you, and you aren't safe to go home alone, we may keep you overnight. Insurance often won't pay for this.  After surgery  If it's hard to control your pain or you need more pain medicine, please call your surgeon's office.  Questions?   If you have any questions for your care team, list them here: _________________________________________________________________________________________________________________________________________________________________________ ____________________________________ ____________________________________ ____________________________________

## 2023-01-05 NOTE — ANESTHESIA PREPROCEDURE EVALUATION
Anesthesia Pre-Procedure Evaluation    Patient: Natacha Espinoza   MRN: [id number] : 1985        Procedure : Procedure(s):  HYSTEROSCOPY, WITH DILATION AND CURETTAGE OF UTERUS USING MORCELLATOR          Past Medical History:   Diagnosis Date     Menorrhagia      Uterine myoma       Past Surgical History:   Procedure Laterality Date     GYN SURGERY  2014    salpingectomy-one side only     OPERATIVE HYSTEROSCOPY WITH MORCELLATOR N/A 2018    Procedure: OPERATIVE HYSTEROSCOPY WITH MORCELLATOR (MYOSURE);  HYSTEROSCOPY WITH MYOSURE ; RESECTION OF INTRACAVITARY MYOMA . ;  Surgeon: Kole Jesus MD;  Location: [facility] OR      Allergies   Allergen Reactions     Chloroquine      Other reaction(s): Itching      Social History     Tobacco Use     Smoking status: Never     Smokeless tobacco: Never   Substance Use Topics     Alcohol use: No      Wt Readings from Last 1 Encounters:   23 70.9 kg (156 lb 3.2 oz)        Anesthesia Evaluation   Pt has had prior anesthetic.         ROS/MED HX  ENT/Pulmonary:       Neurologic:       Cardiovascular:    (-) murmur   METS/Exercise Tolerance: >4 METS    Hematologic: Comments: Abnormal uterine bleeding    (+) History of blood clots (Cerebral venous sinus), pt is anticoagulated,     Musculoskeletal:       GI/Hepatic:       Renal/Genitourinary:       Endo:       Psychiatric/Substance Use:       Infectious Disease:       Malignancy:       Other:     (-) Any chance pregnant       Physical Exam    Airway        Mallampati: III   TM distance: > 3 FB   Neck ROM: full   Mouth opening: > 3 cm    Respiratory Devices and Support         Dental  no notable dental history         Cardiovascular          Rhythm and rate: regular and normal (-) no murmur    Pulmonary   pulmonary exam normal        breath sounds clear to auscultation           OUTSIDE LABS:  CBC:   Lab Results   Component Value Date    WBC 5.2 2023    WBC 5.7 2022    HGB 11.9 2023    HGB 8.0 (L) 2022     HCT 37.6 01/03/2023    HCT 28.1 (L) 03/14/2022     01/03/2023     (H) 03/14/2022     BMP:   Lab Results   Component Value Date     07/07/2009    POTASSIUM 3.8 07/07/2009    CHLORIDE 108 07/07/2009    CO2 23 07/07/2009    BUN 11 07/07/2009    CR 0.81 07/07/2009    GLC 92 07/07/2009     COAGS: No results found for: PTT, INR, FIBR  POC:   Lab Results   Component Value Date    HCG  08/06/2009     Negative   This test provides a presumptive diagnosis of pregnancy. A confirmed pregnancy   diagnosis should only be made by a physician after all clinical and laboratory   findings have been evaluated.     HEPATIC:   Lab Results   Component Value Date    ALBUMIN 4.2 07/07/2009    PROTTOTAL 7.2 07/07/2009    ALT <6 07/07/2009    AST 26 07/07/2009    ALKPHOS 48 07/07/2009    BILITOTAL 0.2 07/07/2009     OTHER:   Lab Results   Component Value Date    ONUR 9.1 07/07/2009    TSH 0.78 03/14/2022    SED 27 (H) 07/06/2012       Anesthesia Plan    ASA Status:  2   NPO Status:  NPO Appropriate    Anesthesia Type: MAC.     - Reason for MAC: immobility needed   Induction: Intravenous, Propofol.   Maintenance: TIVA.        Consents    Anesthesia Plan(s) and associated risks, benefits, and realistic alternatives discussed. Questions answered and patient/representative(s) expressed understanding.    - Discussed:     - Discussed with:  Patient      - Extended Intubation/Ventilatory Support Discussed: No.      - Patient is DNR/DNI Status: No    Use of blood products discussed: No .     Postoperative Care    Pain management: IV analgesics.   PONV prophylaxis: Ondansetron (or other 5HT-3), Background Propofol Infusion     Comments:    Other Comments: Discussed plan for IV anesthetic with native airway. Discussed potential need for conversion to general anesthetic with airway management and potential for recall.  Held Eliquis for multiple weeks.        H&P reviewed: Unable to attach H&P to encounter due to EHR limitations.  H&P Update: appropriate H&P reviewed, patient examined. No interval changes since H&P (within 30 days).         Darius Hernandez MD

## 2023-01-06 ENCOUNTER — HOSPITAL ENCOUNTER (OUTPATIENT)
Facility: AMBULATORY SURGERY CENTER | Age: 38
Discharge: HOME | End: 2023-01-06
Attending: OBSTETRICS & GYNECOLOGY
Payer: COMMERCIAL

## 2023-01-06 ENCOUNTER — ANESTHESIA (OUTPATIENT)
Dept: SURGERY | Facility: AMBULATORY SURGERY CENTER | Age: 38
End: 2023-01-06
Payer: COMMERCIAL

## 2023-01-06 VITALS
WEIGHT: 156.2 LBS | RESPIRATION RATE: 16 BRPM | HEART RATE: 64 BPM | SYSTOLIC BLOOD PRESSURE: 116 MMHG | TEMPERATURE: 97.1 F | OXYGEN SATURATION: 97 % | BODY MASS INDEX: 27.2 KG/M2 | DIASTOLIC BLOOD PRESSURE: 81 MMHG

## 2023-01-06 LAB — HCG UR QL: NEGATIVE

## 2023-01-06 PROCEDURE — 88305 TISSUE EXAM BY PATHOLOGIST: CPT | Performed by: PATHOLOGY

## 2023-01-06 PROCEDURE — 58558 HYSTEROSCOPY BIOPSY: CPT

## 2023-01-06 PROCEDURE — G8907 PT DOC NO EVENTS ON DISCHARG: HCPCS

## 2023-01-06 PROCEDURE — G8918 PT W/O PREOP ORDER IV AB PRO: HCPCS

## 2023-01-06 PROCEDURE — 58558 HYSTEROSCOPY BIOPSY: CPT | Performed by: OBSTETRICS & GYNECOLOGY

## 2023-01-06 PROCEDURE — 81025 URINE PREGNANCY TEST: CPT | Performed by: OBSTETRICS & GYNECOLOGY

## 2023-01-06 RX ORDER — FENTANYL CITRATE 50 UG/ML
50 INJECTION, SOLUTION INTRAMUSCULAR; INTRAVENOUS EVERY 5 MIN PRN
Status: DISCONTINUED | OUTPATIENT
Start: 2023-01-06 | End: 2023-01-07 | Stop reason: HOSPADM

## 2023-01-06 RX ORDER — HYDROXYZINE HYDROCHLORIDE 25 MG/1
25 TABLET, FILM COATED ORAL
Status: DISCONTINUED | OUTPATIENT
Start: 2023-01-06 | End: 2023-01-07 | Stop reason: HOSPADM

## 2023-01-06 RX ORDER — PROPOFOL 10 MG/ML
INJECTION, EMULSION INTRAVENOUS CONTINUOUS PRN
Status: DISCONTINUED | OUTPATIENT
Start: 2023-01-06 | End: 2023-01-06

## 2023-01-06 RX ORDER — OXYCODONE HYDROCHLORIDE 5 MG/1
5 TABLET ORAL EVERY 4 HOURS PRN
Status: DISCONTINUED | OUTPATIENT
Start: 2023-01-06 | End: 2023-01-07 | Stop reason: HOSPADM

## 2023-01-06 RX ORDER — OXYCODONE HYDROCHLORIDE 5 MG/1
5 TABLET ORAL
Status: DISCONTINUED | OUTPATIENT
Start: 2023-01-06 | End: 2023-01-07 | Stop reason: HOSPADM

## 2023-01-06 RX ORDER — ONDANSETRON 4 MG/1
4 TABLET, ORALLY DISINTEGRATING ORAL EVERY 30 MIN PRN
Status: DISCONTINUED | OUTPATIENT
Start: 2023-01-06 | End: 2023-01-07 | Stop reason: HOSPADM

## 2023-01-06 RX ORDER — ACETAMINOPHEN 325 MG/1
975 TABLET ORAL ONCE
Status: COMPLETED | OUTPATIENT
Start: 2023-01-06 | End: 2023-01-06

## 2023-01-06 RX ORDER — FENTANYL CITRATE 50 UG/ML
INJECTION, SOLUTION INTRAMUSCULAR; INTRAVENOUS PRN
Status: DISCONTINUED | OUTPATIENT
Start: 2023-01-06 | End: 2023-01-06

## 2023-01-06 RX ORDER — OXYCODONE HYDROCHLORIDE 5 MG/1
10 TABLET ORAL EVERY 4 HOURS PRN
Status: DISCONTINUED | OUTPATIENT
Start: 2023-01-06 | End: 2023-01-07 | Stop reason: HOSPADM

## 2023-01-06 RX ORDER — HYDRALAZINE HYDROCHLORIDE 20 MG/ML
2.5-5 INJECTION INTRAMUSCULAR; INTRAVENOUS EVERY 10 MIN PRN
Status: DISCONTINUED | OUTPATIENT
Start: 2023-01-06 | End: 2023-01-07 | Stop reason: HOSPADM

## 2023-01-06 RX ORDER — BUPIVACAINE HYDROCHLORIDE AND EPINEPHRINE 2.5; 5 MG/ML; UG/ML
INJECTION, SOLUTION INFILTRATION; PERINEURAL PRN
Status: DISCONTINUED | OUTPATIENT
Start: 2023-01-06 | End: 2023-01-06 | Stop reason: HOSPADM

## 2023-01-06 RX ORDER — FENTANYL CITRATE 50 UG/ML
25 INJECTION, SOLUTION INTRAMUSCULAR; INTRAVENOUS EVERY 5 MIN PRN
Status: DISCONTINUED | OUTPATIENT
Start: 2023-01-06 | End: 2023-01-07 | Stop reason: HOSPADM

## 2023-01-06 RX ORDER — ACETAMINOPHEN 325 MG/1
975 TABLET ORAL ONCE
Status: DISCONTINUED | OUTPATIENT
Start: 2023-01-06 | End: 2023-01-07 | Stop reason: HOSPADM

## 2023-01-06 RX ORDER — SODIUM CHLORIDE, SODIUM LACTATE, POTASSIUM CHLORIDE, CALCIUM CHLORIDE 600; 310; 30; 20 MG/100ML; MG/100ML; MG/100ML; MG/100ML
INJECTION, SOLUTION INTRAVENOUS CONTINUOUS
Status: DISCONTINUED | OUTPATIENT
Start: 2023-01-06 | End: 2023-01-07 | Stop reason: HOSPADM

## 2023-01-06 RX ORDER — LIDOCAINE HYDROCHLORIDE 20 MG/ML
INJECTION, SOLUTION INFILTRATION; PERINEURAL PRN
Status: DISCONTINUED | OUTPATIENT
Start: 2023-01-06 | End: 2023-01-06

## 2023-01-06 RX ORDER — MEPERIDINE HYDROCHLORIDE 25 MG/ML
12.5 INJECTION INTRAMUSCULAR; INTRAVENOUS; SUBCUTANEOUS
Status: DISCONTINUED | OUTPATIENT
Start: 2023-01-06 | End: 2023-01-07 | Stop reason: HOSPADM

## 2023-01-06 RX ORDER — ALBUTEROL SULFATE 0.83 MG/ML
2.5 SOLUTION RESPIRATORY (INHALATION) EVERY 4 HOURS PRN
Status: DISCONTINUED | OUTPATIENT
Start: 2023-01-06 | End: 2023-01-07 | Stop reason: HOSPADM

## 2023-01-06 RX ORDER — ONDANSETRON 2 MG/ML
4 INJECTION INTRAMUSCULAR; INTRAVENOUS EVERY 30 MIN PRN
Status: DISCONTINUED | OUTPATIENT
Start: 2023-01-06 | End: 2023-01-07 | Stop reason: HOSPADM

## 2023-01-06 RX ORDER — LORAZEPAM 2 MG/ML
.5-1 INJECTION INTRAMUSCULAR
Status: DISCONTINUED | OUTPATIENT
Start: 2023-01-06 | End: 2023-01-07 | Stop reason: HOSPADM

## 2023-01-06 RX ORDER — LIDOCAINE 40 MG/G
CREAM TOPICAL
Status: DISCONTINUED | OUTPATIENT
Start: 2023-01-06 | End: 2023-01-07 | Stop reason: HOSPADM

## 2023-01-06 RX ORDER — IBUPROFEN 400 MG/1
800 TABLET, FILM COATED ORAL ONCE
Status: DISCONTINUED | OUTPATIENT
Start: 2023-01-06 | End: 2023-01-07 | Stop reason: HOSPADM

## 2023-01-06 RX ORDER — FENTANYL CITRATE 50 UG/ML
25 INJECTION, SOLUTION INTRAMUSCULAR; INTRAVENOUS
Status: DISCONTINUED | OUTPATIENT
Start: 2023-01-06 | End: 2023-01-07 | Stop reason: HOSPADM

## 2023-01-06 RX ADMIN — LIDOCAINE HYDROCHLORIDE 60 MG: 20 INJECTION, SOLUTION INFILTRATION; PERINEURAL at 12:36

## 2023-01-06 RX ADMIN — FENTANYL CITRATE 25 MCG: 50 INJECTION, SOLUTION INTRAMUSCULAR; INTRAVENOUS at 12:56

## 2023-01-06 RX ADMIN — SODIUM CHLORIDE, SODIUM LACTATE, POTASSIUM CHLORIDE, CALCIUM CHLORIDE: 600; 310; 30; 20 INJECTION, SOLUTION INTRAVENOUS at 11:56

## 2023-01-06 RX ADMIN — FENTANYL CITRATE 25 MCG: 50 INJECTION, SOLUTION INTRAMUSCULAR; INTRAVENOUS at 12:45

## 2023-01-06 RX ADMIN — PROPOFOL 125 MCG/KG/MIN: 10 INJECTION, EMULSION INTRAVENOUS at 12:36

## 2023-01-06 RX ADMIN — ACETAMINOPHEN 975 MG: 325 TABLET ORAL at 11:44

## 2023-01-06 RX ADMIN — PROPOFOL 50 MG: 10 INJECTION, EMULSION INTRAVENOUS at 12:40

## 2023-01-06 NOTE — DISCHARGE INSTRUCTIONS
[facility]   Adult Discharge Orders & Instructions     For 24 hours after surgery    Get plenty of rest.  A responsible adult must stay with you for at least 24 hours after you leave the hospital.   Do not drive or use heavy equipment.  If you have weakness or tingling, don't drive or use heavy equipment until this feeling goes away.  Do not drink alcohol.  Avoid strenuous or risky activities.  Ask for help when climbing stairs.   You may feel lightheaded.  IF so, sit for a few minutes before standing.  Have someone help you get up.   If you have nausea (feel sick to your stomach): Drink only clear liquids such as apple juice, ginger ale, broth or 7-Up.  Rest may also help.  Be sure to drink enough fluids.  Move to a regular diet as you feel able.  You may have a slight fever. Call the doctor if your fever is over 100 F (37.7 C) (taken under the tongue) or lasts longer than 24 hours.  You may have a dry mouth, a sore throat, muscle aches or trouble sleeping.  These should go away after 24 hours.  Do not make important or legal decisions.     Call your doctor for any of the followin.  Signs of infection (fever, growing tenderness at the surgery site, a large amount of drainage or bleeding, severe pain, foul-smelling drainage, redness, swelling).    2. It has been over 8 to 10 hours since surgery and you are still not able to urinate (pass water).    3.  Headache for over 24 hours.    4. Signs of Covid-19 infection (temperature over 100 degrees, shortness of breath, cough, loss of taste/smell, generalized body aches, persistent headache,                  chills, sore throat, nausea/vomiting/diarrhea).    Tylenol 975 mg was given at 11:45 AM.  You should not take more then 4,000 mg of tylenol/acetaminophen in a 24 hour period.

## 2023-01-06 NOTE — OP NOTE
HOSPITAL OPERATIVE NOTE  DATE/TIME OF SURGERY: 2023  PATIENT NAME: Natacha Espinoza  MRN: [id number]  PATIENT : 1985      Preoperative Diagnosis:  AUB-L    Postoperative Diagnosis:   Abnormal uterine bleeding  Intramural fibroid  Endometrial polyp  Mid-uterine thickened septum vs uterine adhesions from prior uterine surgery    Surgeon: Lena Jade DO    Procedure: Hysteroscopy with myosure myomectomy, dilation and curettage    Anesthesia: MAC, paracervical block    EBL:   5 ml    Urine output:    NA    IVF:  100  ml    Speciman:  Endometrial curettings, endometrial polyp    Findings: Exam under anesthesia revealed an anteverted uterus. During hysteroscopy, bilateral ostia were not visualized - only able to visualize left ostia. Endometrial lining appeared proliferative. Large mid-line septum vs thick adhesion from prior uterine surgery. Appears to havea connection between uterine horms. Small endometrial polyp left just inferior to left uterine ostia.     Complications:  None    Indication: Natacha Espinoza is a 37 year old  who presents today for scheduled operative hysteroscopy and dilation and curettage for AUB-L, desiring fertility. Details of the procedure were discussed with the patient.  Risks include, but are not limited to, bleeding, infection, and injury to surrounding organs such as the bowel, urinary system, nerves, and blood vessels.  Injury may result in repair at the time of the surgery or in a separate procedure.  All questions answered, and accepting these risks, the patient elects to proceed with the procedure.    Procedure: Patient was taken to the operating room where she was placed under MAC without complication.  She was prepped and draped in the normal sterile fashion.  Exam under anesthesia revealed findings as above.  A medium Grave's speculum was placed in the vagina.  The anterior lip of the cervix was grasped with single-tooth tenaculum.  A paracervical block was placed using  0.25% marcaine with epi at 4 and 8 o'clock. Cervix was sequentially dilated with Arredondo dilators to 23.  The MyoSure hysteroscope was then advanced the uterus with findings as above.  The MyoSure device was then activated per protocol with removal of endometrial polyp and direct uterine wall biopsies.  Completed, the MyoSure was removed. The single-tooth tenaculum was removed from the cervix and good hemostasis was noted.  All instruments were removed from the vagina and the procedure was ended.    Patient tolerated the procedure well and was taken to PACU in stable condition.  All instrument sponge counts were correct x2    Lena Jade DO

## 2023-01-06 NOTE — ANESTHESIA CARE TRANSFER NOTE
Patient: Natacha Espinoza    Procedure: Procedure(s):  HYSTEROSCOPY, WITH DILATION AND CURETTAGE OF UTERUS USING MORCELLATOR       Diagnosis: Abnormal uterine bleeding (AUB) [N93.9]  Diagnosis Additional Information: No value filed.    Anesthesia Type:   MAC     Note:    Oropharynx: oropharynx clear of all foreign objects and spontaneously breathing  Level of Consciousness: drowsy  Oxygen Supplementation: room air    Independent Airway: airway patency satisfactory and stable  Dentition: dentition unchanged  Vital Signs Stable: post-procedure vital signs reviewed and stable  Report to RN Given: handoff report given  Patient transferred to: Phase II    Handoff Report: Identifed the Patient, Identified the Reponsible Provider, Reviewed the pertinent medical history, Discussed the surgical course, Reviewed Intra-OP anesthesia mangement and issues during anesthesia, Set expectations for post-procedure period and Allowed opportunity for questions and acknowledgement of understanding      Vitals:  Vitals Value Taken Time   BP     Temp     Pulse     Resp     SpO2 98 % 01/06/23 1309   Vitals shown include unvalidated device data.    Electronically Signed By: MARSHA Chang CRNA  January 6, 2023  1:10 PM

## 2023-01-06 NOTE — ANESTHESIA POSTPROCEDURE EVALUATION
Patient: Natacha Espinoza    Procedure: Procedure(s):  HYSTEROSCOPY, WITH DILATION AND CURETTAGE OF UTERUS USING MORCELLATOR       Anesthesia Type:  MAC    Note:  Disposition: Outpatient   Postop Pain Control: Uneventful            Sign Out: Well controlled pain   PONV: No   Neuro/Psych: Uneventful            Sign Out: Acceptable/Baseline neuro status   Airway/Respiratory: Uneventful            Sign Out: Acceptable/Baseline resp. status   CV/Hemodynamics: Uneventful            Sign Out: Acceptable CV status; No obvious hypovolemia; No obvious fluid overload   Other NRE:    DID A NON-ROUTINE EVENT OCCUR? No           Last vitals:  Vitals Value Taken Time   /81 01/06/23 1330   Temp 97.1  F (36.2  C) 01/06/23 1307   Pulse 64 01/06/23 1330   Resp 16 01/06/23 1330   SpO2 99 % 01/06/23 1339   Vitals shown include unvalidated device data.    Electronically Signed By: Darius Hernandez MD  January 6, 2023  3:47 PM

## 2023-01-10 LAB
PATH REPORT.COMMENTS IMP SPEC: NORMAL
PATH REPORT.COMMENTS IMP SPEC: NORMAL
PATH REPORT.FINAL DX SPEC: NORMAL
PATH REPORT.GROSS SPEC: NORMAL
PATH REPORT.MICROSCOPIC SPEC OTHER STN: NORMAL
PATH REPORT.RELEVANT HX SPEC: NORMAL
PHOTO IMAGE: NORMAL

## 2023-01-26 ENCOUNTER — OFFICE VISIT (OUTPATIENT)
Dept: OBGYN | Facility: CLINIC | Age: 38
End: 2023-01-26
Payer: COMMERCIAL

## 2023-01-26 VITALS
DIASTOLIC BLOOD PRESSURE: 78 MMHG | WEIGHT: 156 LBS | HEART RATE: 77 BPM | BODY MASS INDEX: 27.16 KG/M2 | SYSTOLIC BLOOD PRESSURE: 119 MMHG | OXYGEN SATURATION: 100 %

## 2023-01-26 DIAGNOSIS — Z98.890 POSTOPERATIVE STATE: Primary | ICD-10-CM

## 2023-01-26 DIAGNOSIS — Q51.9 UTERINE ANOMALY: ICD-10-CM

## 2023-01-26 DIAGNOSIS — D25.0 SUBMUCOUS LEIOMYOMA OF UTERUS: ICD-10-CM

## 2023-01-26 DIAGNOSIS — N97.9 FEMALE INFERTILITY: ICD-10-CM

## 2023-01-26 PROCEDURE — 99212 OFFICE O/P EST SF 10 MIN: CPT | Performed by: OBSTETRICS & GYNECOLOGY

## 2023-01-26 NOTE — PATIENT INSTRUCTIONS
[email or web address]                                                                          If you have any questions regarding your visit, Please contact your care team.    [facility]: [phone]      [facility] CLINIC HOURS TELEPHONE NUMBER   Lena RockwelluffmanDO.    ALISA Lucas-Surgery Scheduler  Joanne - Surgery Scheduler    NABEEL Almonte RN Kylie, RN     Monday, Thursday  [place]  7am-3pm    Tuesday, Wednesday  [place]  7am-3pm    [place] Friday &   [place]    Typical Surgery Days: Thursday or Friday   [facility]  [address] [place] [address] [phone] Phone  [phone] Fax    [facility]  [address] [place] [address] [phone] Phone    Imaging [phone] Phone    [facility]:  [phone] Phone     **Surgeries** Our Surgery Schedulers will contact you to schedule. If you do not receive a call within 3 business days, please call [phone].    Urgent Care locations:  [facility] Saturday and    9 am - 5 pm    Monday-Friday   12 pm - 8 pm  Saturday and    9 am - 5 pm   [phone]       If you need a medication refill, please contact your pharmacy. Please allow 3 business days for your refill to be completed.  As always, Thank you for trusting us with your healthcare needs!

## 2023-01-26 NOTE — PROGRESS NOTES
SUBJECTIVE:       HPI: Natacha Espinoza is a 37 year old  is s/p hysteroscopy with myosure on 23 for AUB-L, desiring fertility. Since surgery, she has been doing well. She has had minimal vaginal bleeding.           Today's PHQ-2 Score:   PHQ-2 (  [employer]) 1/3/2023   Q1: Little interest or pleasure in doing things 0   Q2: Feeling down, depressed or hopeless 0   PHQ-2 Score 0   PHQ-2 Total Score (12-17 Years)- Positive if 3 or more points; Administer PHQ-A if positive -   Q1: Little interest or pleasure in doing things Not at all   Q2: Feeling down, depressed or hopeless Not at all   PHQ-2 Score 0     Today's PHQ-9 Score: No flowsheet data found.  Today's CHRISTIN-7 Score: No flowsheet data found.    Problem list and histories reviewed & adjusted, as indicated.  Additional history: as documented.    Patient Active Problem List   Diagnosis     CARDIOVASCULAR SCREENING; LDL GOAL LESS THAN 160     Irregular bleeding     Tubal occlusion     Primary female infertility     Ovarian cyst     Pelvic pain     Cerebral venous sinus thrombosis     Abnormal uterine bleeding due to submucousal leiomyoma of uterus     Past Surgical History:   Procedure Laterality Date     DILATION AND CURETTAGE, OPERATIVE HYSTEROSCOPY WITH MORCELLATOR, COMBINED N/A 2023    Procedure: HYSTEROSCOPY, WITH DILATION AND CURETTAGE OF UTERUS USING MORCELLATOR;  Surgeon: Lena Jade DO;  Location:  OR     GYN SURGERY  2014    salpingectomy-one side only     OPERATIVE HYSTEROSCOPY WITH MORCELLATOR N/A 2018    Procedure: OPERATIVE HYSTEROSCOPY WITH MORCELLATOR (MYOSURE);  HYSTEROSCOPY WITH MYOSURE ; RESECTION OF INTRACAVITARY MYOMA . ;  Surgeon: Kole Jesus MD;  Location:  OR      Social History     Tobacco Use     Smoking status: Never     Smokeless tobacco: Never   Substance Use Topics     Alcohol use: No           acetaminophen (TYLENOL) 500 MG tablet, 2 tab at onset of headache, may repeat in 6 hours if needed, max 2-3  doses per day, max 9 days/month to avoid rebound headache.  acetaminophen (TYLENOL) 500 MG tablet, Take 1-2 tablets (500-1,000 mg) by mouth every 6 hours as needed for mild pain, fever or pain  acetaZOLAMIDE (DIAMOX) 250 MG tablet,   ELIQUIS ANTICOAGULANT 5 MG tablet, Take 5 mg by mouth 2 times daily  ferrous sulfate (SLO-FE) 142 (45 Fe) MG CR tablet, Take 1 tablet (142 mg) by mouth daily  gabapentin (NEURONTIN) 100 MG capsule,   ibuprofen (ADVIL/MOTRIN) 600 MG tablet, Take 1 tablet (600 mg) by mouth every 6 hours as needed for pain (mild)  norethindrone (AYGESTIN) 5 MG tablet, Take 1 tablet (5 mg) by mouth daily    No current facility-administered medications on file prior to visit.    Allergies   Allergen Reactions     Chloroquine      Other reaction(s): Itching       ROS:  10 Point review of systems negative other noted above in HPI    OBJECTIVE:     /78 (BP Location: Right arm, Patient Position: Chair, Cuff Size: Adult Regular)   Pulse 77   Wt 70.8 kg (156 lb)   LMP 2023 (Exact Date)   SpO2 100%   Breastfeeding No   BMI 27.16 kg/m    Body mass index is 27.16 kg/m .      Gen: Alert, oriented, appropriately interactive, NAD  Chest: Symmetrical, unlabored breathing  MSK: normal gait, symmetric movements UE & LE  Lower extremities: non-tender, no edema      In-Clinic Test Results:  No results found for this or any previous visit (from the past 24 hour(s)).   Final Diagnosis   Endometrium, curettage:  - Benign endometrial polyp(s), negative for hyperplasia or atypia  - Fragments of benign smooth muscle, consistent with leiomyoma(s)   Electronically signed by Malena Brooks MD on 1/10/2023 at 12:42 PM         ASSESSMENT/PLAN:                                                      Natacha Espinoza is a 37 year old  s/p  hysteroscopy with myosure on 23 for AUB-L, desiring fertility.       ICD-10-CM    1. Postoperative state  Z98.890       2. Submucous leiomyoma of uterus  D25.0  Infertility/AI Referral      3. Female infertility  N97.9 Infertility/AI Referral      4. Uterine anomaly  Q51.9           Doing well postoperatively.     Surgery findings reviewed extensively with patient, strongly recommending referral to reproductive endocrinology as pregnancy highly unlikely given cavity blockage/fibroid/adhesions.   All questions answered. Patient in agreement and referral placed today, information for [facility] clinics in the area given.         Lena Jade DO  [facility] [place]

## 2023-02-09 NOTE — ANESTHESIA CARE TRANSFER NOTE
Patient: Natacha Espinoza    Procedure(s):  HYSTEROSCOPY WITH MYOSURE ; RESECTION OF INTRACAVITARY MYOMA .  - Wound Class: II-Clean Contaminated    Diagnosis: SUBMUCOUS MYOMA   Diagnosis Additional Information: No value filed.    Anesthesia Type:   General, LMA     Note:  Airway :LMA  Patient transferred to:PACU  Comments: To [facility] PACU: 02 t-piece to LMA, spont resp, Sp02 100%, VSS  Report to RNHandoff Report: Identifed the Patient, Identified the Reponsible Provider, Reviewed the pertinent medical history, Discussed the surgical course, Reviewed Intra-OP anesthesia mangement and issues during anesthesia, Set expectations for post-procedure period and Allowed opportunity for questions and acknowledgement of understanding      Vitals: (Last set prior to Anesthesia Care Transfer)    CRNA VITALS  9/21/2018 0812 - 9/21/2018 0849      9/21/2018             NIBP: 121/74    NIBP Mean: 93                Electronically Signed By: MARSHA Morejon CRNA  September 21, 2018  8:49 AM   PAIN

## 2023-09-06 ENCOUNTER — OFFICE VISIT (OUTPATIENT)
Dept: URGENT CARE | Facility: URGENT CARE | Age: 38
End: 2023-09-06
Payer: COMMERCIAL

## 2023-09-06 VITALS
RESPIRATION RATE: 16 BRPM | TEMPERATURE: 96.7 F | OXYGEN SATURATION: 100 % | DIASTOLIC BLOOD PRESSURE: 74 MMHG | HEART RATE: 83 BPM | SYSTOLIC BLOOD PRESSURE: 118 MMHG | BODY MASS INDEX: 27.16 KG/M2 | WEIGHT: 156 LBS

## 2023-09-06 DIAGNOSIS — R01.1 HEART MURMUR: Primary | ICD-10-CM

## 2023-09-06 DIAGNOSIS — H93.12 TINNITUS OF LEFT EAR: ICD-10-CM

## 2023-09-06 PROBLEM — G44.89 OTHER HEADACHE SYNDROME: Status: ACTIVE | Noted: 2022-02-17

## 2023-09-06 PROCEDURE — 99214 OFFICE O/P EST MOD 30 MIN: CPT | Performed by: PHYSICIAN ASSISTANT

## 2023-09-06 ASSESSMENT — PAIN SCALES - GENERAL: PAINLEVEL: NO PAIN (0)

## 2023-09-06 NOTE — PROGRESS NOTES
"Chief Complaint   Patient presents with    Ear Problem     Patient reports \"whooshing\" sound in left ear beginning two months ago and is worsening. Patient denies pain. Patient has been sent to specialist and placed on prednisone.        ASSESSMENT/PLAN:  Natacha was seen today for ear problem.    Diagnoses and all orders for this visit:    Heart murmur  -     Adult Cardiology Cesiliaal  Referral; Future    Tinnitus of left ear    Incidental murmur identified on exam today, systolic whooshing.  Do recommend she follow-up with cardiology.  She does have a history of blood clots that she says she is not sure the cause and also has this ongoing issue with tinnitus but does not sound like it is pulsatile tinnitus related to her heartbeat.  Unclear whether not they could be related.  Returned patient's tinnitus which is why she came in here, I am unable to see her specialist notes that she just finished a steroid pack.  Nothing abnormal on the exam today.  I reviewed her MRIs that was done 7/31/2023 and 6/26/2023 both of which were normal and did not explain her symptoms.  Her labs were also normal at that time.  Since they were normal then I do not think it is necessary to repeat labs today.    Recommend she follow back up with her ENT as her case has exceeded the disease of the urgent care.  I do not find any other identifiable cause today    Jose Angel Luna PA-C      SUBJECTIVE:  Natacha is a 38 year old female who presents to urgent care with concerns about 2+ months of a whooshing sound in her ear.  She is got to the ER for this and had MRIs done which were normal.  See previous notes.  She also saw an ENT specialist and was placed on a Medrol Dosepak.  I am unable to see those notes.  She is not exactly sure where she went for this but it was not [facility].  Her symptoms have not improved.  She feels otherwise well.  She does have a history of blood clots but is not sure from what.    ROS: Pertinent ROS neg " other than the symptoms noted above in the HPI.     OBJECTIVE:  /74 (BP Location: Left arm, Patient Position: Sitting, Cuff Size: Adult Regular)   Pulse 83   Temp (!) 96.7  F (35.9  C) (Tympanic)   Resp 16   Wt 70.8 kg (156 lb)   SpO2 100%   BMI 27.16 kg/m     GENERAL: healthy, alert and no distress  EYES: Eyes grossly normal to inspection, PERRL and conjunctivae and sclerae normal  HENT: ear canals and TM's normal, nose and mouth without ulcers or lesions  RESP: lungs clear to auscultation - no rales, rhonchi or wheezes  CV: regular rate and rhythm, 2/6 normal systolic whooshing murmur heard at the left upper sternal border with the bell, no peripheral edema and radial and dorsal pedal peripheral pulses strong, not bounding, no carotid bruits    DIAGNOSTICS    No results found for any visits on 09/06/23.     Current Outpatient Medications   Medication    acetaminophen (TYLENOL) 500 MG tablet    acetaminophen (TYLENOL) 500 MG tablet    acetaZOLAMIDE (DIAMOX) 250 MG tablet    ELIQUIS ANTICOAGULANT 5 MG tablet    ferrous sulfate (SLO-FE) 142 (45 Fe) MG CR tablet    gabapentin (NEURONTIN) 100 MG capsule    ibuprofen (ADVIL/MOTRIN) 600 MG tablet    norethindrone (AYGESTIN) 5 MG tablet     No current facility-administered medications for this visit.      Patient Active Problem List   Diagnosis    CARDIOVASCULAR SCREENING; LDL GOAL LESS THAN 160    Irregular bleeding    Tubal occlusion    Primary female infertility    Ovarian cyst    Pelvic pain    Cerebral venous sinus thrombosis    Abnormal uterine bleeding due to submucousal leiomyoma of uterus      Past Medical History:   Diagnosis Date    Menorrhagia     Uterine myoma      Past Surgical History:   Procedure Laterality Date    DILATION AND CURETTAGE, OPERATIVE HYSTEROSCOPY WITH MORCELLATOR, COMBINED N/A 1/6/2023    Procedure: HYSTEROSCOPY, WITH DILATION AND CURETTAGE OF UTERUS USING MORCELLATOR;  Surgeon: Lena Jade DO;  Location: [facility] OR    GYN  SURGERY  2014    salpingectomy-one side only    OPERATIVE HYSTEROSCOPY WITH MORCELLATOR N/A 09/21/2018    Procedure: OPERATIVE HYSTEROSCOPY WITH MORCELLATOR (MYOSURE);  HYSTEROSCOPY WITH MYOSURE ; RESECTION OF INTRACAVITARY MYOMA . ;  Surgeon: Kole Jesus MD;  Location: [facility] OR     No family history on file.  Social History     Tobacco Use    Smoking status: Never     Passive exposure: Never    Smokeless tobacco: Never   Substance Use Topics    Alcohol use: No              The plan of care was discussed with the patient. They understand and agree with the course of treatment prescribed. A printed summary was given including instructions and medications.  The use of Dragon/SnappCloud dictation services may have been used to construct the content in this note; any grammatical or spelling errors are non-intentional. Please contact the author of this note directly if you are in need of any clarification.

## 2023-11-06 DIAGNOSIS — N92.0 MENORRHAGIA WITH REGULAR CYCLE: ICD-10-CM

## 2023-11-06 RX ORDER — NORETHINDRONE ACETATE 5 MG
5 TABLET ORAL DAILY
Qty: 90 TABLET | Refills: 0 | Status: SHIPPED | OUTPATIENT
Start: 2023-11-06

## 2023-11-06 NOTE — TELEPHONE ENCOUNTER
Pt last seen 1/26/2023 for post op for hysteroscopy D&C    Pt having heavy bleeding since yesterday, states it is a very heavy period. Soaking pads every 30 mins. Pt denies pain, fever or dizziness.    Pt asking for refill of norethindrone tablets.    RN advised ED precautions for heavy bleeding.    RN routing to provider to advise on prescription request.    Marti Truong RN on 11/6/2023 at 11:12 AM

## 2023-11-06 NOTE — TELEPHONE ENCOUNTER
Lena Jade DO   to [facility] T[facility]   KK      11/6/23 11:14 AM  Agree with RN regarding ER precautions.  Lena Jade DO    RN called pt and relayed providers advisement.    Patient verbalized understanding and agreed to plan.     Marti Truong RN on 11/6/2023 at 11:22 AM

## 2023-11-14 NOTE — TELEPHONE ENCOUNTER
Pt states she is experiencing heavy bleeding again and would like a refill on her Aygestin.  I see that pt stopped taking it back in June because she states it was not helping with her bleeding.    I questioned pt about that but she wants to try it again because her bleeding is very heavy again.  She states it is so heavy she can't leave her house to do anything.  I asked her if she was soaking through a pad in less than an hour.  She said yes.  I explained to her that is too much bleeding and advised her to go to the ED.  She states she always goes to the ED and they do nothing for her and they tell her that is normal.  I explained to her that she needs to go in to assure she isn't losing too much blood.  Pt just wants to start taking the Aygestin again to help slow down her bleeding.    Pt states she is no longer taking Eliquis.    Routing the refill request to Dr. Jade but also strongly suggested to pt that she should be further evaluated in the ER for her heavy bleeding.  Pt understands.    I also asked pt if she wanted me to transfer her to our surgery scheduler to schedule her surgery.  She said she will call back to schedule.  I explained to her that Dr. Jade advises that she schedule it sooner than September.  Pt understands.    Suzy Goldsmith RN    
In order to check the location, date, or time of your aftercare appointment, please refer to your Discharge Instructions Document given to you upon leaving the hospital.  If you have lost the instructions please call [phone]

## 2024-03-27 NOTE — TELEPHONE ENCOUNTER
Pt calling while at pharmacy and they do not have the Rx for her pyrantel Pamoate available as written.  Pt handed the phone to the pharmacist  Brand name of Pyrantel Pamoate is Pin-X  Pharmacy only has Chewable in 720.5 mg dose    Based on pt's weight from today, pharmacist recommended 720.5mg one dose  [place] in 14-21 days if necessary    Dr. Jesus out of office. Consulted the on call Dr. Lawrence and she verbalized it is ok to go with pharmacist's recommendations as written. Sent rx per verbal order of Dr. Lawrence.  
The patient is a 57y Female complaining of fever.

## 2024-10-04 DIAGNOSIS — N92.0 MENORRHAGIA WITH REGULAR CYCLE: ICD-10-CM

## 2024-10-04 RX ORDER — NORETHINDRONE 5 MG/1
5 TABLET ORAL DAILY
Qty: 90 TABLET | Refills: 0 | OUTPATIENT
Start: 2024-10-04

## 2024-10-04 NOTE — TELEPHONE ENCOUNTER
Pt has not been seen by Dr. Jade since January, 2023.    norethindrone (AYGESTIN) 5 MG tablet was last prescribed on 11/6/23 for a 3 month supply with no refills.    Pt has most likely been out of medication for about 8 months.    Pt needs to be seen for refills.    Suzy Goldsmith RN- OB/GYN group

## 2024-11-26 NOTE — PATIENT INSTRUCTIONS
If you have any questions regarding your visit, Please contact your care team.    [employer] Access Services: [phone]      [facility] CLINIC HOURS TELEPHONE NUMBER   Lena Jade DO.    ALISA Lucas-Surgery Scheduler  Joanne - Surgery Scheduler    NABEEL Almonte, NABEEL Kidd RN     Monday, Thursday  [place]  7am-3pm    Tuesday, Wednesday  [place]  7am-3pm    Friday  [place]  1pm-3:30pm    Typical Surgery Days: Thursday or Friday   [facility]  [address] [place] [address] [phone] Phone  [phone] Fax    [facility]  [address] [place] [address] [phone] Phone    Imaging [phone] Phone    [facility] Labor and Delivery:  [phone] Phone     **Surgeries** Our Surgery Schedulers will contact you to schedule. If you do not receive a call within 3 business days, please call [phone].    Urgent Care locations:  [facility] Saturday and    9 am - 5 pm    Monday-Friday   12 pm - 8 pm  Saturday and    9 am - 5 pm   [phone]       If you need a medication refill, please contact your pharmacy. Please allow 3 business days for your refill to be completed.  As always, Thank you for trusting us with your healthcare needs!      Labs that we order for further evaluation of fertility:  On day 3 of your cycle: E2 (estradiol), LH, FSH  On day 21 of your cycle: Progesterone  Either time: TSH, Prolactin    Other testing recommended:  Semen analysis for partner  HSG (Hysterosalpingogram) - performed in radiology department on days 5-10 of cycle

## 2024-12-02 ENCOUNTER — OFFICE VISIT (OUTPATIENT)
Dept: OBGYN | Facility: CLINIC | Age: 39
End: 2024-12-02
Payer: COMMERCIAL

## 2024-12-02 VITALS
WEIGHT: 164.9 LBS | BODY MASS INDEX: 28.71 KG/M2 | OXYGEN SATURATION: 100 % | HEART RATE: 90 BPM | DIASTOLIC BLOOD PRESSURE: 73 MMHG | SYSTOLIC BLOOD PRESSURE: 111 MMHG

## 2024-12-02 DIAGNOSIS — N93.9 ABNORMAL UTERINE BLEEDING (AUB): ICD-10-CM

## 2024-12-02 DIAGNOSIS — Z01.419 WELL WOMAN EXAM WITH ROUTINE GYNECOLOGICAL EXAM: Primary | ICD-10-CM

## 2024-12-02 DIAGNOSIS — Z31.41 FERTILITY TESTING: ICD-10-CM

## 2024-12-02 DIAGNOSIS — N94.6 DYSMENORRHEA: ICD-10-CM

## 2024-12-02 PROCEDURE — 99214 OFFICE O/P EST MOD 30 MIN: CPT | Mod: 25 | Performed by: OBSTETRICS & GYNECOLOGY

## 2024-12-02 PROCEDURE — 99395 PREV VISIT EST AGE 18-39: CPT | Performed by: OBSTETRICS & GYNECOLOGY

## 2024-12-02 RX ORDER — NORETHINDRONE 5 MG/1
5 TABLET ORAL DAILY
Qty: 90 TABLET | Refills: 4 | Status: SHIPPED | OUTPATIENT
Start: 2024-12-02

## 2024-12-02 NOTE — PROGRESS NOTES
SUBJECTIVE:       HPI: Natacha Espinoza is a 39 year old  who presents today for WWE, med refill.   Desires pregnancy in near future, just .  Has been off Aygestin for a month with severe and irregular bleeding and dysmenorrhea.  Desires Aygestin to be refilled but also to start work up for fertility.      Gyn Hx: Patient's last menstrual period was 11/15/2024.    Patient is sexually active.  male partner   Last pap was 3/2022 nil neg hpv   STI history - gonorrhea >10 years ago   Using none for contraception.          reports that she has never smoked. She has never been exposed to tobacco smoke. She has never used smokeless tobacco.      Today's PHQ-2 Score:       1/3/2023     7:30 AM   PHQ-2 (  [employer])   Q1: Little interest or pleasure in doing things 0    Q2: Feeling down, depressed or hopeless 0    PHQ-2 Score 0   Q1: Little interest or pleasure in doing things Not at all   Q2: Feeling down, depressed or hopeless Not at all   PHQ-2 Score 0       Patient-reported     Today's PHQ-9 Score:        No data to display              Today's CHRISTIN-7 Score:        No data to display                Problem list and histories reviewed & adjusted, as indicated.  Additional history: as documented.    Patient Active Problem List   Diagnosis    CARDIOVASCULAR SCREENING; LDL GOAL LESS THAN 160    Irregular bleeding    Tubal occlusion    Primary female infertility    Ovarian cyst    Pelvic pain    Cerebral venous sinus thrombosis    Abnormal uterine bleeding due to submucousal leiomyoma of uterus    Other headache syndrome    Tinnitus of left ear     Past Surgical History:   Procedure Laterality Date    DILATION AND CURETTAGE, OPERATIVE HYSTEROSCOPY WITH MORCELLATOR, COMBINED N/A 2023    Procedure: HYSTEROSCOPY, WITH DILATION AND CURETTAGE OF UTERUS USING MORCELLATOR;  Surgeon: Lena Jade DO;  Location: [facility] OR    GYN SURGERY  2014    salpingectomy-one side only    OPERATIVE HYSTEROSCOPY WITH MORCELLATOR N/A  09/21/2018    Procedure: OPERATIVE HYSTEROSCOPY WITH MORCELLATOR (MYOSURE);  HYSTEROSCOPY WITH MYOSURE ; RESECTION OF INTRACAVITARY MYOMA . ;  Surgeon: Kole Jesus MD;  Location:  OR      Social History     Tobacco Use    Smoking status: Never     Passive exposure: Never    Smokeless tobacco: Never   Substance Use Topics    Alcohol use: No      No data available              Current Outpatient Medications   Medication Sig Dispense Refill    acetaminophen (TYLENOL) 500 MG tablet Take 1-2 tablets (500-1,000 mg) by mouth every 6 hours as needed for mild pain, fever or pain 60 tablet 1    ibuprofen (ADVIL/MOTRIN) 600 MG tablet Take 1 tablet (600 mg) by mouth every 6 hours as needed for pain (mild) 40 tablet 0    norethindrone (AYGESTIN) 5 MG tablet Take 1 tablet (5 mg) by mouth daily. 90 tablet 4    acetaminophen (TYLENOL) 500 MG tablet 2 tab at onset of headache, may repeat in 6 hours if needed, max 2-3 doses per day, max 9 days/month to avoid rebound headache. (Patient not taking: Reported on 12/2/2024)      acetaZOLAMIDE (DIAMOX) 250 MG tablet  (Patient not taking: Reported on 12/2/2024)      ELIQUIS ANTICOAGULANT 5 MG tablet Take 5 mg by mouth 2 times daily (Patient not taking: Reported on 12/2/2024)      ferrous sulfate (SLO-FE) 142 (45 Fe) MG CR tablet Take 1 tablet (142 mg) by mouth daily (Patient not taking: Reported on 12/2/2024) 90 tablet 3    gabapentin (NEURONTIN) 100 MG capsule  (Patient not taking: Reported on 12/2/2024)       No current facility-administered medications for this visit.     Allergies   Allergen Reactions    Chloroquine      Other reaction(s): Itching       ROS:  10 Point review of systems negative other noted above in HPI    OBJECTIVE:     /73 (BP Location: Right arm, Patient Position: Sitting, Cuff Size: Adult Regular)   Pulse 90   Wt 74.8 kg (164 lb 14.4 oz)   LMP 11/15/2024   SpO2 100%   BMI 28.71 kg/m    Body mass index is 28.71 kg/m .      Gen: Alert, oriented,  appropriately interactive, NAD  Eyes: Eyes grossly normal to inspection, conjunctivae and sclerae normal  Resp: no audible wheeze, cough, or visible cyanosis.  No visible retractions or increased work of breathing.  Able to speak fully in complete sentences.  Breasts: declined  Pelvic- declined  MSK: normal gait, symmetric movements UE & LE  Lower extremities: non-tender, no edema  Neuro: Cranial nerves grossly intact, mentation intact and speech normal  Psych: mentation appears normal, affect normal/bright, judgement and insight intact, normal speech and appearance well-groomed      In-Clinic Test Results:  No results found for this or any previous visit (from the past 24 hours).    ASSESSMENT/PLAN:                                                      Natacha Espinoza is a 39 year old  who presents today for WWE, med refill      ICD-10-CM    1. Well woman exam with routine gynecological exam  Z01.419       2. Abnormal uterine bleeding (AUB)  N93.9 norethindrone (AYGESTIN) 5 MG tablet     US Pelvic Transabdominal and Transvaginal     Follicle stimulating hormone     Estradiol     TSH with free T4 reflex     Prolactin     Follicle stimulating hormone     Progesterone      3. Fertility testing  Z31.41 Follicle stimulating hormone     Estradiol     TSH with free T4 reflex     Prolactin     Follicle stimulating hormone     Progesterone      4. Dysmenorrhea  N94.6           Pap with cotesting due in  per ASCCP guidelines  STI screen- declined  Contraception- declined  Flu vaccine- declined  COVID vaccine- declined  AUB off Aygestin with severe dysmenorrhea, desiring fertility in the future. Fertility evaluation briefly reviewed, baseline labs and imaging ordered at this time. RTO 1-2 months to reassess, complete evaluation. All questions answered. Aygestin refilled per request.      Lena Jade DO  [facility] [place]

## 2024-12-02 NOTE — LETTER
December 2, 2024      Natacha Espinoza  [address]   [facility] [address]        To Whom It May Concern:    Natacha Espinoza  was seen on 12/2/24.  Please excuse her from work on an intermittent basis due to menstrual pain.        Sincerely,        Lena Jade, DO

## 2025-03-27 ENCOUNTER — TELEPHONE (OUTPATIENT)
Dept: OBGYN | Facility: CLINIC | Age: 40
End: 2025-03-27
Payer: COMMERCIAL

## 2025-03-27 NOTE — TELEPHONE ENCOUNTER
"----- Message from Katja WILLETT sent at 3/27/2025  7:56 AM CDT -----  Hi,    I just wanted to forward this as I know this was on Dr. Sosa's scheduled prior and it was discussed as best to be seen by Titus as it is her patient and Ian really can't do or bill for an annual at this point for this patient.  In Sergey's absence, could you forward to Michelle or Tiffanie if you feel it would be best managed by them as opposed to triage?  Thank you.    Patient saw Dr. Jade for a physical 12/2/24    She has tried to schedule with Dr. Sosa twice, has scheduled with him now on 3/31/25.  Appointment details:    \"pt states her annual was never done 12/24 although it was listed as one. Pt wants an annual exam and was very frustrated when writer asked about last one with Dr. Jade, appt per pt, recs in epic, location verified\"    Appears that on 12/2/24 she declined certain services, was not due to pap.  Had been scheduled with Dr. Jade on 1/13/25, no show.  "

## 2025-03-27 NOTE — TELEPHONE ENCOUNTER
Pt is scheduled with Dr. Sosa on 3/31/25.  In appt notes it states annual wasn't done.    However, pt was seen by Dr. Jade on 12/2/24 for a WWE.  It appears that pt declined pelvic and breast exam.  Pt is not due for a pap smear until 2027.  Labs were ordered and medication was refills for AUB.  Labs were also ordered for infertility testing.    Attempted to reach pt to find out what she is wanting to be seen for on 3/31 and to let her know that another yearly exam/well woman exam can not be completed as she just had one in December, 2024.     Unable to reach patient via phone. Left message to call back at [phone].    Suzy Goldsmith RN-[facility]

## 2025-03-28 NOTE — TELEPHONE ENCOUNTER
Unable to reach patient via phone. Left message to call back at [phone].    Suzy Goldsmith RN-[facility]

## 2025-03-31 ENCOUNTER — OFFICE VISIT (OUTPATIENT)
Dept: OBGYN | Facility: CLINIC | Age: 40
End: 2025-03-31
Payer: COMMERCIAL

## 2025-03-31 VITALS
OXYGEN SATURATION: 100 % | HEART RATE: 84 BPM | SYSTOLIC BLOOD PRESSURE: 125 MMHG | DIASTOLIC BLOOD PRESSURE: 73 MMHG | BODY MASS INDEX: 28.68 KG/M2 | WEIGHT: 168 LBS | HEIGHT: 64 IN

## 2025-03-31 DIAGNOSIS — N92.0 MENORRHAGIA WITH REGULAR CYCLE: ICD-10-CM

## 2025-03-31 DIAGNOSIS — Z01.419 WELL WOMAN EXAM WITH ROUTINE GYNECOLOGICAL EXAM: Primary | ICD-10-CM

## 2025-03-31 DIAGNOSIS — H93.12 EAR RINGING SOUND, LEFT: ICD-10-CM

## 2025-03-31 DIAGNOSIS — D25.0 SUBMUCOUS LEIOMYOMA OF UTERUS: ICD-10-CM

## 2025-03-31 PROCEDURE — 99395 PREV VISIT EST AGE 18-39: CPT | Performed by: GENERAL PRACTICE

## 2025-03-31 PROCEDURE — 3074F SYST BP LT 130 MM HG: CPT | Performed by: GENERAL PRACTICE

## 2025-03-31 PROCEDURE — 3078F DIAST BP <80 MM HG: CPT | Performed by: GENERAL PRACTICE

## 2025-03-31 RX ORDER — NORETHINDRONE 5 MG/1
5 TABLET ORAL DAILY
Qty: 30 TABLET | Refills: 11 | Status: SHIPPED | OUTPATIENT
Start: 2025-03-31

## 2025-03-31 NOTE — TELEPHONE ENCOUNTER
Talked with patient.      Advised her to check with her insurance as some insurances are every calendar year and some insurances are 365 days.      Told her if every 365 and she wants to discuss something else that  is okay.  But if her insurance will not pay, she can can cancel or she can come but will be billed for what insurance does not pay for.

## 2025-03-31 NOTE — PROGRESS NOTES
March 31, 2025  aye, Michelle       3/31/25  9:33 AM  Note  Talked with patient.       Advised her to check with her insurance as some insurances are every calendar year and some insurances are 365 days.       Told her if every 365 and she wants to discuss something else that  is okay.  But if her insurance will not pay, she can can cancel or she can come but will be billed for what insurance does not pay for.           Patient states she is due for annual exam today and it will be covered.  Katja Gunderson LPN on 3/31/2025 at 3:40 PM

## 2025-03-31 NOTE — PATIENT INSTRUCTIONS
If you have labs or imaging done, the results will automatically release in [facility] without an interpretation.  Your health care professional will review those results and send an interpretation with recommendations as soon as possible, but this may be 1-3 business days.    If you have any questions regarding your visit, please contact your care team.     [employer] Access Services: [phone]  [facility] CLINIC HOURS TELEPHONE NUMBER   Damon MCDONOUGH Ian .      Marti-NABEEL Almonte-NABEEL Linares-Surgery Scheduler  Joanne-         Monday-[place]  8:00 am-4:00 pm  [facility]  8:00 am-4:00 pm  Wednesday-[place] 8:00 am-4:00 pm  Thursday-[place] 8:00 am-4:00 pm    Typical Surgery Day Friday [facility]  [address] [place] [address]  PH: [phone] Fax    Imaging Scheduling all locations  PH: [phone]     [facility] Labor and Delivery  [address]  [place], [place] [address] [phone]    [facility]  [address] [place] [address]  PH: [phone]     **Surgeries** Our Surgery Schedulers will contact you to schedule. If you do not receive a call within 3 business days, please call [phone].  Urgent Care locations:  [facility]       Monday-Friday   10 am - 8 pm  Saturday and Sunday   9 am - 5 pm   [phone]   If you need a medication refill, please contact your pharmacy. Please allow 3 business days for your refill to be completed.  As always, Thank you for trusting us with your healthcare needs!

## 2025-03-31 NOTE — PROGRESS NOTES
"Natacha is a 39 year old  here for annual exam.  She does note a \"whooshing\" sound in her left ear which is very bothersome to her.  She has no pain or other symptoms. She is currently desiring pregnancy next year. Has been taking aygestin while she is menstruating for control of heavy bleeding.  Requesting refill of Aygestin.  Has history of fibroid uterus.    ROS:   Weight loss or gain: denies  Abdominal bloating / pain: denies  Appetite: normal  Energy: normal  Nausea / vomiting: denies  Fevers / chills / night sweats: denies  SOB / cough: denies  Chest pain / palpitations: denies  Diarrhea / constipation: denies  Bloody / tar-colored stools: denies  Urinary frequency / urgency / blood: denies  Headaches / vision changes: denies  Mouth sores: denies  Skin changes / rashes: denies      GYNHx:   No LMP recorded.  Cycles: Regular, last 3-4 days  Menorrhagia: ++, some clots  Dysmenorrhea: ++  Last paps:  3/2022: NILM/HPV-  Lab Results   Component Value Date    PAP NIL 2013    PAP NIL 2012    PAP NIL 2009   Prior abnormal pap: denies  History STI: denies  Gardasil Hx: no        OBHx:  OB History    Para Term  AB Living   0 0 0 0 0 0   SAB IAB Ectopic Multiple Live Births   0 0 0 0 0         Patient Active Problem List    Abnormal uterine bleeding due to submucousal leiomyoma of uterus         Priority: Medium [2]         Date Noted: 2022            Added automatically from request for surgery            [id number]      Other headache syndrome         Priority: Medium [2]         Date Noted: 2022      Tinnitus of left ear         Priority: Medium [2]         Date Noted: 2022      Cerebral venous sinus thrombosis         Priority: Medium [2]         Date Noted: 2021      Pelvic pain         Priority: Medium [2]         Date Noted: 2013      Ovarian cyst         Priority: Medium [2]         Date Noted: 2012      Tubal occlusion         Priority: Medium " [2]         Date Noted: 07/06/2012      Primary female infertility         Priority: Medium [2]         Date Noted: 07/06/2012      Irregular bleeding         Priority: Medium [2]         Date Noted: 04/24/2012      CARDIOVASCULAR SCREENING; LDL GOAL LESS THAN 160         Priority: Medium [2]         Date Noted: 10/31/2010          PSH:   Past Surgical History:   Procedure Laterality Date    DILATION AND CURETTAGE, OPERATIVE HYSTEROSCOPY WITH MORCELLATOR, COMBINED N/A 1/6/2023    Procedure: HYSTEROSCOPY, WITH DILATION AND CURETTAGE OF UTERUS USING MORCELLATOR;  Surgeon: Lena Jade DO;  Location: [facility] OR    GYN SURGERY  2014    salpingectomy-one side only    OPERATIVE HYSTEROSCOPY WITH MORCELLATOR N/A 09/21/2018    Procedure: OPERATIVE HYSTEROSCOPY WITH MORCELLATOR (MYOSURE);  HYSTEROSCOPY WITH MYOSURE ; RESECTION OF INTRACAVITARY MYOMA . ;  Surgeon: Kole Jesus MD;  Location:  OR       PMH:  Past Medical History:   Diagnosis Date    Cerebral venous sinus thrombosis     Menorrhagia     Tinnitus, left     Uterine myoma         MEDs  Current Outpatient Medications   Medication Sig Dispense Refill    acetaminophen (TYLENOL) 500 MG tablet Take 1-2 tablets (500-1,000 mg) by mouth every 6 hours as needed for mild pain, fever or pain 60 tablet 1    ibuprofen (ADVIL/MOTRIN) 600 MG tablet Take 1 tablet (600 mg) by mouth every 6 hours as needed for pain (mild) 40 tablet 0    norethindrone (AYGESTIN) 5 MG tablet Take 1 tablet (5 mg) by mouth daily. 90 tablet 4    acetaminophen (TYLENOL) 500 MG tablet 2 tab at onset of headache, may repeat in 6 hours if needed, max 2-3 doses per day, max 9 days/month to avoid rebound headache. (Patient not taking: Reported on 3/31/2025)      acetaZOLAMIDE (DIAMOX) 250 MG tablet  (Patient not taking: Reported on 9/6/2023)      ELIQUIS ANTICOAGULANT 5 MG tablet Take 5 mg by mouth 2 times daily (Patient not taking: Reported on 9/6/2023)      ferrous sulfate (SLO-FE) 142 (45 Fe)  "MG CR tablet Take 1 tablet (142 mg) by mouth daily (Patient not taking: Reported on 3/31/2025) 90 tablet 3    gabapentin (NEURONTIN) 100 MG capsule  (Patient not taking: Reported on 9/6/2023)       No current facility-administered medications for this visit.       Allergies:  Allergies   Allergen Reactions    Chloroquine      Other reaction(s): Itching       FamHx:  History reviewed. No pertinent family history.    SocHx:  Social History     Socioeconomic History    Marital status: Single     Spouse name: Not on file    Number of children: Not on file    Years of education: Not on file    Highest education level: Not on file   Occupational History    Not on file   Tobacco Use    Smoking status: Never     Passive exposure: Never    Smokeless tobacco: Never   Vaping Use    Vaping status: Never Used   Substance and Sexual Activity    Alcohol use: No    Drug use: No    Sexual activity: Yes     Partners: Male   Other Topics Concern    Parent/sibling w/ CABG, MI or angioplasty before 65F 55M? Not Asked   Social History Narrative    Not on file     Social Drivers of Health     Financial Resource Strain: Not on file   Food Insecurity: Not on file   Transportation Needs: Not on file   Physical Activity: Not on file   Stress: Not on file   Social Connections: Not on file   Interpersonal Safety: Not on file   Housing Stability: Not on file         Last cholesterol: No results found for: \"CHOL\"]       PE: /73   Pulse 84   Ht 1.614 m (5' 3.54\")   Wt 76.2 kg (168 lb)   SpO2 100%   BMI 29.26 kg/m    Body mass index is 29.26 kg/m .    General Appearance:  healthy, alert, active, no distress  Cardiovascular:  Regular rate and Rhythm  Neck: Supple, no adenopathy, and thyroid normal  Ears: Bilateral ears normal in appearance, tympanic membrane's are nonerythematous and nontense.  Clear fluid behind membrane.  No abnormality seen bilaterally.  Lungs:  Clear, without wheeze, rale or rhonchi  Breast: DECLINED  Abdomen: Benign, " No masses, organomegaly, and Soft, non-tender.   Pelvic:       - Ext: Vulva and perineum are normal without lesion, mass or discharge        - Urethra: normal without discharge or scarring        - Bladder: No tenderness, No masses       - Vagina: Normal vaginal epithelium with no masses lesions.  Physiologic discharge is noted.  No blood in the vault.       - Cervix: Normal appearance no masses or lesions.       - Uterus:Normal shape, position and consistency. 10 wk size       - Adnexa: Normal without masses or tenderness       - Rectal: deferred        A/P: Well Woman, notable for sound in left ear however normal exam, fibroid uterus and heavy menstrual bleeding.  Patient with history of cerebral venous sinus thrombosis.   - Cerebral venous sinus thrombosis: Patient self discontinued her Eliquis due to heavier menstrual bleeding.  I discussed with patient that her long-term blood thinner should only be discontinued under the direction of a hematologist or primary care provider.  Would recommend the patient establish care with primary care provider and consideration for return to hematology clinic.  Discussed importance of potential prevention of other blood clots.   - Pap was not performed.  Next due 2027   - Aygestin refilled for menorrhagia   - Labs: CBC, BMP, fasting lipids   -Previously on ferrous sulfate.  Will also obtain iron panel and ferritin.   -Will obtain pelvic ultrasound to assess for interval change of 4 cm fibroid.  Discussed symptoms may be beneficial in reducing her heavy bleeding symptoms and could potentially decrease risk of pregnancy.      - Encouraged healthy diet, regular exercise, self-breast examination.      Damon Sosa DO, FACOG

## 2025-04-07 ENCOUNTER — OFFICE VISIT (OUTPATIENT)
Dept: FAMILY MEDICINE | Facility: CLINIC | Age: 40
End: 2025-04-07
Payer: COMMERCIAL

## 2025-04-07 VITALS
RESPIRATION RATE: 16 BRPM | WEIGHT: 168.2 LBS | HEIGHT: 64 IN | DIASTOLIC BLOOD PRESSURE: 73 MMHG | OXYGEN SATURATION: 100 % | SYSTOLIC BLOOD PRESSURE: 119 MMHG | TEMPERATURE: 98 F | HEART RATE: 77 BPM | BODY MASS INDEX: 28.71 KG/M2

## 2025-04-07 DIAGNOSIS — Z01.419 WELL WOMAN EXAM WITH ROUTINE GYNECOLOGICAL EXAM: ICD-10-CM

## 2025-04-07 DIAGNOSIS — Z86.718 PERSONAL HISTORY OF VENOUS THROMBOSIS AND EMBOLISM: Primary | ICD-10-CM

## 2025-04-07 LAB
ANION GAP SERPL CALCULATED.3IONS-SCNC: 11 MMOL/L (ref 7–15)
BUN SERPL-MCNC: 12 MG/DL (ref 6–20)
CALCIUM SERPL-MCNC: 9.5 MG/DL (ref 8.8–10.4)
CHLORIDE SERPL-SCNC: 108 MMOL/L (ref 98–107)
CHOLEST SERPL-MCNC: 241 MG/DL
CREAT SERPL-MCNC: 0.86 MG/DL (ref 0.51–0.95)
EGFRCR SERPLBLD CKD-EPI 2021: 89 ML/MIN/1.73M2
ERYTHROCYTE [DISTWIDTH] IN BLOOD BY AUTOMATED COUNT: 15.4 % (ref 10–15)
FASTING STATUS PATIENT QL REPORTED: YES
FASTING STATUS PATIENT QL REPORTED: YES
FERRITIN SERPL-MCNC: 19 NG/ML (ref 6–175)
GLUCOSE SERPL-MCNC: 110 MG/DL (ref 70–99)
HCO3 SERPL-SCNC: 22 MMOL/L (ref 22–29)
HCT VFR BLD AUTO: 37.2 % (ref 35–47)
HDLC SERPL-MCNC: 81 MG/DL
HGB BLD-MCNC: 12.1 G/DL (ref 11.7–15.7)
IRON BINDING CAPACITY (ROCHE): 378 UG/DL (ref 240–430)
IRON SATN MFR SERPL: 7 % (ref 15–46)
IRON SERPL-MCNC: 28 UG/DL (ref 37–145)
LDLC SERPL CALC-MCNC: 152 MG/DL
MCH RBC QN AUTO: 26.5 PG (ref 26.5–33)
MCHC RBC AUTO-ENTMCNC: 32.5 G/DL (ref 31.5–36.5)
MCV RBC AUTO: 81 FL (ref 78–100)
NONHDLC SERPL-MCNC: 160 MG/DL
PLATELET # BLD AUTO: 428 10E3/UL (ref 150–450)
POTASSIUM SERPL-SCNC: 4.4 MMOL/L (ref 3.4–5.3)
RBC # BLD AUTO: 4.57 10E6/UL (ref 3.8–5.2)
SODIUM SERPL-SCNC: 141 MMOL/L (ref 135–145)
TRIGL SERPL-MCNC: 41 MG/DL
WBC # BLD AUTO: 4.5 10E3/UL (ref 4–11)

## 2025-04-07 PROCEDURE — 1126F AMNT PAIN NOTED NONE PRSNT: CPT | Performed by: FAMILY MEDICINE

## 2025-04-07 PROCEDURE — 80048 BASIC METABOLIC PNL TOTAL CA: CPT | Performed by: FAMILY MEDICINE

## 2025-04-07 PROCEDURE — 80061 LIPID PANEL: CPT | Performed by: FAMILY MEDICINE

## 2025-04-07 PROCEDURE — 99213 OFFICE O/P EST LOW 20 MIN: CPT | Performed by: FAMILY MEDICINE

## 2025-04-07 PROCEDURE — 82728 ASSAY OF FERRITIN: CPT | Performed by: FAMILY MEDICINE

## 2025-04-07 PROCEDURE — 3078F DIAST BP <80 MM HG: CPT | Performed by: FAMILY MEDICINE

## 2025-04-07 PROCEDURE — 83540 ASSAY OF IRON: CPT | Performed by: FAMILY MEDICINE

## 2025-04-07 PROCEDURE — 83550 IRON BINDING TEST: CPT | Performed by: FAMILY MEDICINE

## 2025-04-07 PROCEDURE — 3074F SYST BP LT 130 MM HG: CPT | Performed by: FAMILY MEDICINE

## 2025-04-07 PROCEDURE — 85027 COMPLETE CBC AUTOMATED: CPT | Performed by: FAMILY MEDICINE

## 2025-04-07 ASSESSMENT — PAIN SCALES - GENERAL: PAINLEVEL_OUTOF10: NO PAIN (0)

## 2025-04-07 NOTE — PROGRESS NOTES
"  Assessment & Plan     Personal history of venous thrombosis and embolism  Discussed that she was treated appropriately and risk for future.    Well woman exam with routine gynecological exam  Order by GYN  - CBC with platelets  - Basic metabolic panel  (Ca, Cl, CO2, Creat, Gluc, K, Na, BUN)  - Iron and iron binding capacity  - Ferritin  - Lipid panel reflex to direct LDL Fasting          BMI  Estimated body mass index is 29.29 kg/m  as calculated from the following:    Height as of this encounter: 1.614 m (5' 3.54\").    Weight as of this encounter: 76.3 kg (168 lb 3.2 oz).   Weight management plan: Discussed healthy diet and exercise guidelines      Recommended AFE to establish long term care.    Lizbeth Manzano is a 39 year old, presenting for the following health issues:  Blood Draw and Deep Vein Thrombosis      4/7/2025     8:42 AM   Additional Questions   Roomed by Ysabel   Accompanied by spouse     History of Present Illness       Reason for visit:  To See a provider for previous blood clot and lab test    She eats 0-1 servings of fruits and vegetables daily.She consumes 0 sweetened beverage(s) daily.She exercises with enough effort to increase her heart rate 9 or less minutes per day.  She exercises with enough effort to increase her heart rate 3 or less days per week.   She is taking medications regularly.        Patient is here to establish with a primary care provider due to history to cerebral venous sinus thrombosis with TIA in 2021.  She was treated with lovenox and Eliquis for 12 month.  No residual neurologic deficits. No current headaches or neurologic concerns.  Recently seen by OB/GYN and primary care provider recommended.          Review of Systems  Constitutional, HEENT, cardiovascular, pulmonary, GI, , musculoskeletal, neuro, skin, endocrine and psych systems are negative, except as otherwise noted.      Objective    /73 (BP Location: Left arm, Patient Position: Sitting, Cuff Size: " "Adult Large)   Pulse 77   Temp 98  F (36.7  C) (Temporal)   Resp 16   Ht 1.614 m (5' 3.54\")   Wt 76.3 kg (168 lb 3.2 oz)   LMP 03/15/2025 (Approximate)   SpO2 100%   BMI 29.29 kg/m    Body mass index is 29.29 kg/m .  Physical Exam   GENERAL: alert and no distress  EYES: Eyes grossly normal to inspection, PERRL and conjunctivae and sclerae normal  NECK: no adenopathy, no asymmetry, masses, or scars  RESP: lungs clear to auscultation - no rales, rhonchi or wheezes  CV: regular rate and rhythm, normal S1 S2, no S3 or S4, no murmur, click or rub, no peripheral edema  ABDOMEN: soft, nontender, no hepatosplenomegaly, no masses and bowel sounds normal  MS: no gross musculoskeletal defects noted, no edema  SKIN: no suspicious lesions or rashes  NEURO: Normal strength and tone, mentation intact and speech normal  PSYCH: mentation appears normal, affect normal/bright            Signed Electronically by: Emilie Neal MD    "

## 2025-04-07 NOTE — PATIENT INSTRUCTIONS
At [facility], we strive to deliver an exceptional experience to you, every time we see you. If you receive a survey, please let us know what we are doing well and/or what we could improve upon, as we do value your feedback.  If you have MyChart, you can expect to receive results automatically within 24 hours of their completion.  Your provider will send a note interpreting your results as well.   If you do not have MyChart, you should receive your results in about a week by mail.    Your care team:                            Family Medicine Internal Medicine   MD Boris Mccloud, MD Emilie Matos, MD Primitivo Castillo, MD Dora Escoto, PA-C    Curtis Rodas, MD Pediatrics   Sun Luna, MD Jessica Verde, MARSHA Contreras CNP Imelda Govea, MD Shanice Vo, MD Tracey Mackey, CNP     Eloise Fitzpatrick, PA-C Same-Day Provider (No follow-up visits)   MARSHA Noel, FRANKLIN Elkins, PA-C    Teresa Juarez PA-C     Clinic hours: Monday - Thursday 7 am-6 pm; Fridays 7 am-5 pm.   Urgent care: Monday - Friday 10 am- 8 pm; Saturday and Sunday 9 am-5 pm.    Clinic: [phone]       [facility]: Monday - Thursday 8 am - 7 pm; Friday 8 am - 6 pm  [facility]: [phone]

## (undated) DEVICE — TUBING SYS AQUILEX BLUE INFLOW AQL-110 YLW OUTFLOW AQL-111

## (undated) DEVICE — PREP SKIN SCRUB TRAY 4461A

## (undated) DEVICE — DRAPE GYN/UROLOGY FLUID POUCH TUR 29455

## (undated) DEVICE — NDL SPINAL 22GA 3.5" QUINCKE 405181

## (undated) DEVICE — PACK MINOR SBA15MIFSE

## (undated) DEVICE — NDL 22GA 1.5"

## (undated) DEVICE — PACK TVT HYSTEROSCOPY SMA15HYFSE

## (undated) DEVICE — CATH INTERMITTENT CLEAN-CATH FEMALE 14FR 6" VINYL LF 420614

## (undated) DEVICE — SYR 10ML FINGER CONTROL W/O NDL 309695

## (undated) DEVICE — SYR 03ML LL W/O NDL

## (undated) DEVICE — GLOVE PROTEXIS W/NEU-THERA 8.0  2D73TE80

## (undated) DEVICE — SUCTION CANISTER BEMIS HI FLOW 006772-901

## (undated) DEVICE — KIT PROCEDURE FLUENT IN/OUT FLOWPAK TISS TRAP FLT-112S

## (undated) DEVICE — NDL SPINAL 22GA 5" QUINCKE 405148

## (undated) DEVICE — Device

## (undated) DEVICE — DILATOR PROBE UTERINE OS CANAL FINDER 260-610

## (undated) DEVICE — SOL WATER IRRIG 1000ML BOTTLE 07139-09

## (undated) DEVICE — DRSG TELFA 3X8" 1238

## (undated) DEVICE — SOL NACL 0.9% IRRIG 3000ML BAG 07972-08

## (undated) DEVICE — LINEN TOWEL PACK X5 5464

## (undated) DEVICE — DECANTER BAG 2002S

## (undated) DEVICE — PREP POVIDONE IODINE USP 10% TOPICAL SOL 64537161

## (undated) DEVICE — SOL WATER IRRIG 1000ML BOTTLE 2F7114

## (undated) DEVICE — SEAL SET MYOSURE ROD LENS SCOPE SINGLE USE 40-902

## (undated) DEVICE — PREP POVIDONE IODINE USP 7.5% CLEANING SOL 64538161

## (undated) DEVICE — SOL NACL 0.9% IRRIG 1000ML BOTTLE 07138-09

## (undated) DEVICE — GLOVE PROTEXIS W/NEU-THERA 5.5  2D73TE55

## (undated) RX ORDER — DEXAMETHASONE SODIUM PHOSPHATE 4 MG/ML
INJECTION, SOLUTION INTRA-ARTICULAR; INTRALESIONAL; INTRAMUSCULAR; INTRAVENOUS; SOFT TISSUE
Status: DISPENSED
Start: 2018-09-21

## (undated) RX ORDER — PROPOFOL 10 MG/ML
INJECTION, EMULSION INTRAVENOUS
Status: DISPENSED
Start: 2018-09-21

## (undated) RX ORDER — ACETAMINOPHEN 325 MG/1
TABLET ORAL
Status: DISPENSED
Start: 2023-01-06

## (undated) RX ORDER — ONDANSETRON 2 MG/ML
INJECTION INTRAMUSCULAR; INTRAVENOUS
Status: DISPENSED
Start: 2018-09-21

## (undated) RX ORDER — HYDROCODONE BITARTRATE AND ACETAMINOPHEN 5; 325 MG/1; MG/1
TABLET ORAL
Status: DISPENSED
Start: 2018-09-21

## (undated) RX ORDER — FENTANYL CITRATE 50 UG/ML
INJECTION, SOLUTION INTRAMUSCULAR; INTRAVENOUS
Status: DISPENSED
Start: 2018-09-21

## (undated) RX ORDER — GLYCOPYRROLATE 0.2 MG/ML
INJECTION, SOLUTION INTRAMUSCULAR; INTRAVENOUS
Status: DISPENSED
Start: 2018-09-21

## (undated) RX ORDER — VASOPRESSIN 20 U/ML
INJECTION PARENTERAL
Status: DISPENSED
Start: 2018-09-21

## (undated) RX ORDER — KETOROLAC TROMETHAMINE 30 MG/ML
INJECTION, SOLUTION INTRAMUSCULAR; INTRAVENOUS
Status: DISPENSED
Start: 2018-09-21

## (undated) RX ORDER — LIDOCAINE HYDROCHLORIDE 20 MG/ML
INJECTION, SOLUTION EPIDURAL; INFILTRATION; INTRACAUDAL; PERINEURAL
Status: DISPENSED
Start: 2018-09-21

## (undated) RX ORDER — CEFAZOLIN SODIUM 2 G/100ML
INJECTION, SOLUTION INTRAVENOUS
Status: DISPENSED
Start: 2018-09-21

## (undated) RX ORDER — FENTANYL CITRATE 50 UG/ML
INJECTION, SOLUTION INTRAMUSCULAR; INTRAVENOUS
Status: DISPENSED
Start: 2023-01-06